# Patient Record
Sex: FEMALE | Race: WHITE | NOT HISPANIC OR LATINO | Employment: UNEMPLOYED | URBAN - METROPOLITAN AREA
[De-identification: names, ages, dates, MRNs, and addresses within clinical notes are randomized per-mention and may not be internally consistent; named-entity substitution may affect disease eponyms.]

---

## 2024-09-05 ENCOUNTER — TRANSCRIBE ORDERS (OUTPATIENT)
Dept: ADMINISTRATIVE | Facility: HOSPITAL | Age: 49
End: 2024-09-05

## 2024-09-05 ENCOUNTER — HOSPITAL ENCOUNTER (OUTPATIENT)
Dept: GENERAL RADIOLOGY | Facility: HOSPITAL | Age: 49
Discharge: HOME | End: 2024-09-05
Admitting: NURSE PRACTITIONER
Payer: MEDICAID

## 2024-09-05 DIAGNOSIS — R06.02 SHORTNESS OF BREATH: Primary | ICD-10-CM

## 2024-09-05 DIAGNOSIS — R06.02 SHORTNESS OF BREATH: ICD-10-CM

## 2024-09-05 DIAGNOSIS — R06.00 DYSPNEA, UNSPECIFIED TYPE: ICD-10-CM

## 2024-09-05 PROCEDURE — 71046 X-RAY EXAM CHEST 2 VIEWS: CPT

## 2024-10-10 ENCOUNTER — OFFICE VISIT (OUTPATIENT)
Dept: FAMILY MEDICINE CLINIC | Facility: CLINIC | Age: 49
End: 2024-10-10
Payer: MEDICAID

## 2024-10-10 VITALS
DIASTOLIC BLOOD PRESSURE: 81 MMHG | HEIGHT: 67 IN | SYSTOLIC BLOOD PRESSURE: 133 MMHG | HEART RATE: 86 BPM | WEIGHT: 138.5 LBS | OXYGEN SATURATION: 99 % | BODY MASS INDEX: 21.74 KG/M2

## 2024-10-10 DIAGNOSIS — R06.02 SHORTNESS OF BREATH: ICD-10-CM

## 2024-10-10 DIAGNOSIS — Z12.11 SCREEN FOR COLON CANCER: ICD-10-CM

## 2024-10-10 DIAGNOSIS — Z12.4 SCREENING FOR CERVICAL CANCER: ICD-10-CM

## 2024-10-10 DIAGNOSIS — Z23 NEED FOR TDAP VACCINATION: ICD-10-CM

## 2024-10-10 DIAGNOSIS — Z23 NEEDS FLU SHOT: ICD-10-CM

## 2024-10-10 DIAGNOSIS — R00.0 RACING HEART BEAT: ICD-10-CM

## 2024-10-10 DIAGNOSIS — Z12.2 SCREENING FOR LUNG CANCER: ICD-10-CM

## 2024-10-10 DIAGNOSIS — Z00.00 ANNUAL PHYSICAL EXAM: Primary | ICD-10-CM

## 2024-10-10 DIAGNOSIS — F17.200 SMOKER: ICD-10-CM

## 2024-10-10 DIAGNOSIS — Z76.89 ENCOUNTER TO ESTABLISH CARE: ICD-10-CM

## 2024-10-10 DIAGNOSIS — Z11.59 NEED FOR HEPATITIS C SCREENING TEST: ICD-10-CM

## 2024-10-10 DIAGNOSIS — Z12.31 ENCOUNTER FOR SCREENING MAMMOGRAM FOR MALIGNANT NEOPLASM OF BREAST: ICD-10-CM

## 2024-10-10 PROCEDURE — 99386 PREV VISIT NEW AGE 40-64: CPT

## 2024-10-10 PROCEDURE — 1160F RVW MEDS BY RX/DR IN RCRD: CPT

## 2024-10-10 PROCEDURE — 90715 TDAP VACCINE 7 YRS/> IM: CPT

## 2024-10-10 PROCEDURE — 2014F MENTAL STATUS ASSESS: CPT

## 2024-10-10 PROCEDURE — 90656 IIV3 VACC NO PRSV 0.5 ML IM: CPT

## 2024-10-10 PROCEDURE — 1159F MED LIST DOCD IN RCRD: CPT

## 2024-10-10 PROCEDURE — 90472 IMMUNIZATION ADMIN EACH ADD: CPT

## 2024-10-10 PROCEDURE — 90471 IMMUNIZATION ADMIN: CPT

## 2024-10-10 RX ORDER — IBUPROFEN 400 MG/1
1 TABLET, FILM COATED ORAL 3 TIMES DAILY
COMMUNITY
Start: 2024-09-11

## 2024-10-10 RX ORDER — LIDOCAINE 50 MG/G
1 PATCH TOPICAL EVERY 24 HOURS
COMMUNITY
Start: 2024-08-12

## 2024-10-10 RX ORDER — ALBUTEROL SULFATE 90 UG/1
2 AEROSOL, METERED RESPIRATORY (INHALATION) EVERY 4 HOURS PRN
COMMUNITY
Start: 2024-09-04

## 2024-10-10 RX ORDER — HYDROXYZINE PAMOATE 25 MG/1
25 CAPSULE ORAL 3 TIMES DAILY PRN
COMMUNITY
Start: 2024-10-07

## 2024-10-10 RX ORDER — QUETIAPINE FUMARATE 50 MG/1
50 TABLET, FILM COATED ORAL
COMMUNITY
Start: 2024-10-04

## 2024-10-10 RX ORDER — BUPRENORPHINE HYDROCHLORIDE AND NALOXONE HYDROCHLORIDE DIHYDRATE 8; 2 MG/1; MG/1
1 TABLET SUBLINGUAL DAILY
COMMUNITY
Start: 2024-10-04

## 2024-10-10 RX ORDER — PAROXETINE 20 MG/1
1 TABLET, FILM COATED ORAL DAILY
COMMUNITY
Start: 2024-10-07

## 2024-10-10 NOTE — PROGRESS NOTES
Chief Complaint  Establish Care and Annual Exam    SUBJECTIVE  Analisa Willett presents to [facility]    History of Present Illness  Patient is a 49-year-old female who presents today to establish care.  Previous PCP was [place], [place].  She is due annual physical exam, to be done in office today.     Patient is due lung cancer screening.  Will order low-dose CT for smoking history of 37.8 years.  Patient reports she has no shortness of breath.  Unsure if she has COPD.  Will order PFTs to assess.  Patient currently uses Ventolin as needed.    Patient reports that in 2016 she was told she had congestive heart failure.  Unable to find any records of this.  Patient does have shortness of breath and heart racing.  Will place referral to cardiology.  Will order BNP.    FLU- today    TDAP-  today     MAMMO-ordered today     PAP- obgyn referral to be placed, states she was told in the past she should get a hysterectomy, unsure of why.     COLON cancer screening- cologuard ordered today    SHINGLES-will have done next week as walk in appt    Patient is due labs. Orders placed at today's visit. Discussed with patient that these are fasting labs.           Past Medical History:   Diagnosis Date    Anxiety     Asthma     COPD (chronic obstructive pulmonary disease)     Shortness of breath     Substance abuse       Family History   Problem Relation Age of Onset    Heart disease Mother     Diabetes Mother     Heart disease Father     Diabetes Father     Diabetes Sister       Past Surgical History:   Procedure Laterality Date    CHOLECYSTECTOMY          Current Outpatient Medications:     buprenorphine-naloxone (SUBOXONE) 8-2 MG per SL tablet, Place 1 tablet under the tongue Daily., Disp: , Rfl:     hydrOXYzine pamoate (VISTARIL) 25 MG capsule, Take 1 capsule by mouth 3 (Three) Times a Day As Needed for Anxiety., Disp: , Rfl:     ibuprofen (ADVIL,MOTRIN) 400 MG  "tablet, Take 1 tablet by mouth 3 times a day., Disp: , Rfl:     lidocaine (LIDODERM) 5 %, Place 1 patch on the skin as directed by provider Daily., Disp: , Rfl:     One Daily Womens tablet tablet, Take 1 tablet by mouth Daily., Disp: , Rfl:     PARoxetine (PAXIL) 20 MG tablet, Take 1 tablet by mouth Daily., Disp: , Rfl:     QUEtiapine (SEROquel) 50 MG tablet, Take 1 tablet by mouth every night at bedtime., Disp: , Rfl:     Ventolin  (90 Base) MCG/ACT inhaler, Inhale 2 puffs Every 4 (Four) Hours As Needed for Shortness of Air., Disp: , Rfl:     OBJECTIVE  Vital Signs:   /81 (BP Location: Left arm, Patient Position: Sitting, Cuff Size: Adult)   Pulse 86   Ht 170.2 cm (67\")   Wt 62.8 kg (138 lb 8 oz)   SpO2 99%   BMI 21.69 kg/m²    Estimated body mass index is 21.69 kg/m² as calculated from the following:    Height as of this encounter: 170.2 cm (67\").    Weight as of this encounter: 62.8 kg (138 lb 8 oz).     Wt Readings from Last 3 Encounters:   10/10/24 62.8 kg (138 lb 8 oz)     BP Readings from Last 3 Encounters:   10/10/24 133/81       Physical Exam  Vitals reviewed.   Constitutional:       General: She is awake. She is not in acute distress.     Appearance: She is well-developed and well-groomed. She is not ill-appearing.   HENT:      Head: Normocephalic and atraumatic.      Right Ear: Tympanic membrane, ear canal and external ear normal.      Left Ear: Tympanic membrane, ear canal and external ear normal.      Nose: Nose normal.      Mouth/Throat:      Mouth: Mucous membranes are moist.      Dentition: Abnormal dentition.      Pharynx: Oropharynx is clear. No oropharyngeal exudate or posterior oropharyngeal erythema.   Eyes:      Extraocular Movements: Extraocular movements intact.      Conjunctiva/sclera: Conjunctivae normal.      Pupils: Pupils are equal, round, and reactive to light.   Neck:      Thyroid: No thyroid mass, thyromegaly or thyroid tenderness.   Cardiovascular:      Rate and " Rhythm: Normal rate and regular rhythm.      Heart sounds: Normal heart sounds.   Pulmonary:      Effort: Pulmonary effort is normal.      Breath sounds: Normal breath sounds.   Abdominal:      General: Abdomen is flat. Bowel sounds are normal. There is no distension.      Palpations: Abdomen is soft.      Tenderness: There is no abdominal tenderness.   Musculoskeletal:         General: Normal range of motion.      Cervical back: Normal range of motion and neck supple. No rigidity or tenderness.   Lymphadenopathy:      Cervical: No cervical adenopathy.   Skin:     General: Skin is warm and dry.   Neurological:      Mental Status: She is alert and oriented to person, place, and time.   Psychiatric:         Mood and Affect: Mood normal.         Behavior: Behavior normal. Behavior is cooperative.         Thought Content: Thought content normal.         Judgment: Judgment normal.          Result Review        XR Chest PA & Lateral    Result Date: 9/6/2024  Impression: No acute process Electronically Signed: Beltran Wood MD  9/6/2024 3:53 PM EDT  Workstation ID: [id number]        The above data has been reviewed by YOLA Carter 10/10/2024 14:19 EDT.          Patient Care Team:  Prerna Brunner APRN as PCP - General (Nurse Practitioner)    BMI is within normal parameters. No other follow-up for BMI required.      ASSESSMENT & PLAN    Diagnoses and all orders for this visit:    1. Annual physical exam (Primary)  Comments:  Preventative counseling  Healthy diet  Daily exercise  Get adequate sleep  Orders:  -     Comprehensive Metabolic Panel; Future  -     CBC & Differential; Future  -     Lipid Panel; Future  -     TSH+Free T4; Future    2. Encounter for screening mammogram for malignant neoplasm of breast  -     Mammo Screening Digital Tomosynthesis Bilateral With CAD; Future    3. Need for hepatitis C screening test  -     Hepatitis C antibody; Future    4. Encounter to establish care  Comments:  Medical history  medications reviewed with patient    5. Screening for cervical cancer  -     Ambulatory Referral to Obstetrics / Gynecology    6. Screen for colon cancer  -     Cologuard - Stool, Per Rectum; Future    7. Screening for lung cancer  -      CT Chest Low Dose Cancer Screening WO; Future    8. Smoker  Comments:  Smoking cessation counseling provided  Orders:  -      CT Chest Low Dose Cancer Screening WO; Future    9. Need for Tdap vaccination  -     Tdap Vaccine => 8yo IM (BOOSTRIX/ADACEL)    10. Needs flu shot  -     Fluzone >6mos ([id number])    11. Shortness of breath  Comments:  PFTs ordered, continue Ventolin, follow-up in 2 months  Orders:  -     Complete PFT - Pre & Post Bronchodilator; Future  -     proBNP; Future  -     Ambulatory Referral to Cardiology    12. Racing heart beat  Comments:  Referral to cardiology placed  Orders:  -     Ambulatory Referral to Cardiology         Tobacco Use: High Risk (10/10/2024)    Patient History     Smoking Tobacco Use: Every Day     Smokeless Tobacco Use: Never     Passive Exposure: Current       Follow Up     Return in about 2 months (around 12/10/2024) for Next scheduled follow up.      Patient was given instructions and counseling regarding her condition or for health maintenance advice. Please see specific information pulled into the AVS if appropriate.   I have reviewed information obtained and documented by others and I have confirmed the accuracy of this documented note.    YOLA Carter

## 2024-11-05 ENCOUNTER — TELEPHONE (OUTPATIENT)
Dept: FAMILY MEDICINE CLINIC | Facility: CLINIC | Age: 49
End: 2024-11-05
Payer: MEDICAID

## 2024-11-05 NOTE — TELEPHONE ENCOUNTER
"Relay     \"Please inform patient about results\"     Message from Prerna Brunner sent at 11/1/2024 11:53 AM EDT -----    Cologuard negative  "

## 2024-11-08 RX ORDER — ALBUTEROL SULFATE 90 UG/1
2 AEROSOL, METERED RESPIRATORY (INHALATION) EVERY 4 HOURS PRN
Qty: 8 G | Refills: 2 | Status: SHIPPED | OUTPATIENT
Start: 2024-11-08

## 2024-11-08 NOTE — TELEPHONE ENCOUNTER
Caller: Nancy Willettn    Relationship: Self    Best call back number: [phone]     Requested Prescriptions:   Requested Prescriptions     Pending Prescriptions Disp Refills    Ventolin  (90 Base) MCG/ACT inhaler       Sig: Inhale 2 puffs Every 4 (Four) Hours As Needed for Shortness of Air.        Pharmacy where request should be sent: [facility] - [place], [place] - [place] - [address] [facility] [address] - [phone] [facility] [phone] FX     Last office visit with prescribing clinician: 10/10/2024   Last telemedicine visit with prescribing clinician: Visit date not found   Next office visit with prescribing clinician: 12/20/2024       Does the patient have less than a 3 day supply:  [x] Yes  [] No    Would you like a call back once the refill request has been completed: [x] Yes [] No    If the office needs to give you a call back, can they leave a voicemail: [x] Yes [] No    Allan Pandya Rep   11/08/24 09:28 EST

## 2024-11-21 ENCOUNTER — OFFICE VISIT (OUTPATIENT)
Dept: CARDIOLOGY | Facility: CLINIC | Age: 49
End: 2024-11-21
Payer: MEDICAID

## 2024-11-21 VITALS
SYSTOLIC BLOOD PRESSURE: 140 MMHG | WEIGHT: 148.4 LBS | HEART RATE: 70 BPM | HEIGHT: 67 IN | BODY MASS INDEX: 23.29 KG/M2 | DIASTOLIC BLOOD PRESSURE: 83 MMHG

## 2024-11-21 DIAGNOSIS — I50.9 CHRONIC CONGESTIVE HEART FAILURE, UNSPECIFIED HEART FAILURE TYPE: Primary | ICD-10-CM

## 2024-11-21 RX ORDER — SPIRONOLACTONE 25 MG/1
25 TABLET ORAL DAILY
Qty: 90 TABLET | Refills: 3 | Status: SHIPPED | OUTPATIENT
Start: 2024-11-21

## 2024-11-21 RX ORDER — LOSARTAN POTASSIUM 25 MG/1
25 TABLET ORAL DAILY
Qty: 60 TABLET | Refills: 5 | Status: SHIPPED | OUTPATIENT
Start: 2024-11-21

## 2024-11-21 RX ORDER — FUROSEMIDE 40 MG/1
40 TABLET ORAL DAILY
Qty: 90 TABLET | Refills: 3 | Status: SHIPPED | OUTPATIENT
Start: 2024-11-21

## 2024-11-21 NOTE — PROGRESS NOTES
[facility]  Interventional Cardiology Patient Visit Note      Referring Provider:  Prerna Brunner, APRN  [address] [place] [address]    Reason for Referral:   Congestive Heart Failure    History of Presenting Illness:  History of Present Illness  The patient presents for evaluation of heart failure.    She reports experiencing a rapid heartbeat and shortness of breath during strenuous activities or even light walking. These symptoms have been present for approximately 3 months. She also mentions that she cannot carry heavy objects due to breathlessness. She has no history of heart-related procedures. In 2016, she was informed of early signs of congestive heart failure but did not follow up on it. She has experienced rapid heartbeats even at rest on a few occasions.    She does not have any leg swelling but admits to waking up at night due to shortness of breath, which requires her to sit at the edge of the bed to regain her breath. She has no history of high blood pressure but admits to occasional elevated readings. She does not monitor her blood pressure at home. She has no history of stents placed in her heart or any history of heart attacks. She was hospitalized in 2016 for similar symptoms, including swelling in her legs, hands, feet, and face.    She has not sought medical attention for several years and is currently in recovery at [facility]. 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 Her energy levels have been low for the past 3 months. She has gained weight, increasing from 116 pounds to 148 pounds since entering rehab on 2024. She is currently in a 6-month program at [facility] and is unsure of her discharge date.    FAMILY HISTORY  Her grandmother, aunt, and mother had heart disease. Her mother had 4 different open heart surgeries. Her grandmother on her mother's side had kidney cancer and lung cancer. Her daughter   of lung cancer. Her mother had congestive heart failure. Her father had heart problems. Her aunt  of lung cancer. Her grandfather on her mother's side  of throat cancer. Her great grandmother on her mother's side had leukemia, she  6 months before she was born.    Past Medical History  Past Medical History:   Diagnosis Date    Anxiety     Asthma     COPD (chronic obstructive pulmonary disease)     Shortness of breath     Substance abuse          Current Outpatient Medications:     buprenorphine-naloxone (SUBOXONE) 8-2 MG per SL tablet, Place 1 tablet under the tongue Daily., Disp: , Rfl:     hydrOXYzine pamoate (VISTARIL) 25 MG capsule, Take 1 capsule by mouth 3 (Three) Times a Day As Needed for Anxiety., Disp: , Rfl:     ibuprofen (ADVIL,MOTRIN) 400 MG tablet, Take 1 tablet by mouth 3 times a day., Disp: , Rfl:     lidocaine (LIDODERM) 5 %, Place 1 patch on the skin as directed by provider Daily., Disp: , Rfl:     One Daily Womens tablet tablet, Take 1 tablet by mouth Daily., Disp: , Rfl:     PARoxetine (PAXIL) 20 MG tablet, Take 1 tablet by mouth Daily., Disp: , Rfl:     QUEtiapine (SEROquel) 50 MG tablet, Take 1 tablet by mouth every night at bedtime., Disp: , Rfl:     Ventolin  (90 Base) MCG/ACT inhaler, Inhale 2 puffs Every 4 (Four) Hours As Needed for Shortness of Air., Disp: 8 g, Rfl: 2  Current outpatient and discharge medications have been reconciled for the patient.  Reviewed by: Neptali Maldonado MD     There are no discontinued medications.  No Known Allergies   Social History     Tobacco Use    Smoking status: Every Day     Current packs/day: 1.00     Average packs/day: 1 pack/day for 37.9 years (37.9 ttl pk-yrs)     Types: Cigarettes     Start date: 1987     Passive exposure: Current    Smokeless tobacco: Never   Vaping Use    Vaping status: Never Used   Substance Use Topics    Alcohol use: Not Currently    Drug use: Not Currently     Types: Methamphetamines, Heroin     Family  "History   Problem Relation Age of Onset    Heart disease Mother     Diabetes Mother     Heart disease Father     Diabetes Father     Diabetes Sister           Objective   /83 (BP Location: Left arm, Patient Position: Sitting, Cuff Size: Adult)   Pulse 70   Ht 170.2 cm (67\")   Wt 67.3 kg (148 lb 6.4 oz)   BMI 23.24 kg/m²     Wt Readings from Last 3 Encounters:   11/21/24 67.3 kg (148 lb 6.4 oz)   10/10/24 62.8 kg (138 lb 8 oz)     BP Readings from Last 3 Encounters:   11/21/24 140/83   10/10/24 133/81       Physical Exam  Constitutional:  Awake. Not in acute distress. Normal appearance.   Neck: No carotid bruit, hepatojugular reflux or JVD.   Cardiovascular:      Rate and Rhythm: Normal rate and regular rhythm.      Heart sounds: Normal heart sounds, S1 normal and S2 normal. No murmur heard.      No friction rub. No gallop. No S3 or S4 sounds.    Pulmonary: Pulmonary effort is normal. Normal breath sounds. No wheezing, rhonchi or rales.   Extremities: No Bilateral edema is noted.   Skin: Warm and dry. Non cyanotic, No petechiae or rash.   Neurological: Alert and oriented x 3  Psychiatric:  Behavior is cooperative.       Result Review :   The following data was reviewed by Neptali Maldonado MD on 11/21/2024   No results found for: \"PROBNP\"       No results found for: \"TSH\"   No results found for: \"FREET4\"   No results found for: \"DDIMERQUANT\"  No results found for: \"MG\"   No results found for: \"DIGOXIN\"   No results found for: \"TROPONINT\"   No results found for: \"POCTROP\"                 No results found for this or any previous visit.        The ASCVD Risk score (Cory DK, et al., 2019) failed to calculate for the following reasons:    Cannot find a previous HDL lab    Cannot find a previous total cholesterol lab        Assessment  Assessment & Plan  Congestive Heart failure.  The patient's symptoms and clinical presentation, including shortness of breath, waking up at night due to breathlessness, and a " history of early onset congestive heart failure, are indicative of heart failure.   An echocardiogram will be performed to assess cardiac function and determine if it is less than 40%.   She is advised to maintain a daily log of her blood pressure and weight.  Lasix will be initiated, with a regimen of one tablet in the morning and one in the evening for the first five days, followed by a single morning dose thereafter.   The target blood pressure is set at 130/80. She is encouraged to communicate regularly via Dragon Armyt.   If her condition does not improve within the next week, she is advised to seek immediate medical attention at the hospital.  Start losartan 25 g p.o. daily and spironolactone 25 mg p.o. daily.  Patient is due for lab work to be completed within a week.  Risk factor for heart failure include methamphetamine use.  Will schedule coronary CTA for evaluation of coronary artery disease as a workup      2.  Palpitations  The patient reports experiencing heart palpitations both with exertion and at rest.  I suspect that they are due to sinus tachycardia to compensate for the heart failure.  If these palpitations persist, a heart monitor will be considered during the next visit to understand the underlying cause. It is suspected that the palpitations may be due to fluid overload.    3. Medication Management.  The patient is advised to continue her current medications and to start Lasix as prescribed. She is also instructed to check her weight and blood pressure daily.    Review of the blood pressure log will indicate that if patient has hypertension history was not.    Plan                There are no diagnoses linked to this encounter.  Follow Up     No follow-ups on file.      Neptali Maldonado MD  Interventional Cardiology  11/21/2024  13:15 EST      Patient was given instructions and counseling regarding her condition or for health maintenance advice. Please see specific information pulled into the AVS if  appropriate.     Please note that portions of this document were completed using a voice recognition program.     Patient or patient representative verbalized consent for the use of Ambient Listening during the visit with  Neptali Maldonado MD for chart documentation. 11/21/2024  13:32 EST

## 2024-11-22 ENCOUNTER — HOSPITAL ENCOUNTER (OUTPATIENT)
Dept: CT IMAGING | Facility: HOSPITAL | Age: 49
Discharge: HOME | End: 2024-11-22
Payer: MEDICAID

## 2024-11-22 ENCOUNTER — TELEPHONE (OUTPATIENT)
Dept: FAMILY MEDICINE CLINIC | Facility: CLINIC | Age: 49
End: 2024-11-22
Payer: MEDICAID

## 2024-11-22 DIAGNOSIS — F17.200 SMOKER: ICD-10-CM

## 2024-11-22 DIAGNOSIS — I10 PRIMARY HYPERTENSION: Primary | ICD-10-CM

## 2024-11-22 DIAGNOSIS — Z12.2 SCREENING FOR LUNG CANCER: ICD-10-CM

## 2024-11-22 PROCEDURE — 71271 CT THORAX LUNG CANCER SCR C-: CPT

## 2024-11-22 NOTE — TELEPHONE ENCOUNTER
Patient wants to know if Prerna can order a blood pressure cuff for her. She is requesting a call back.

## 2024-11-25 NOTE — TELEPHONE ENCOUNTER
"Relay     \"  Placed DME order as pharmacy will not fill. Unsure if can do DME with insurance, if not can call insurance and request one.   \"    Attempted to leave VM - VM unable             "

## 2024-11-26 DIAGNOSIS — J44.9 CHRONIC OBSTRUCTIVE PULMONARY DISEASE, UNSPECIFIED COPD TYPE: ICD-10-CM

## 2024-11-26 DIAGNOSIS — R91.8 PULMONARY NODULES: Primary | ICD-10-CM

## 2024-12-02 NOTE — TELEPHONE ENCOUNTER
Name: Analisa Willett    Relationship: Self    Best Callback Number: [phone] CALL AFTER 3:30PM    HUB PROVIDED THE RELAY MESSAGE FROM THE OFFICE   PATIENT VOICED UNDERSTANDING AND HAS NO FURTHER QUESTIONS AT THIS TIME    ADDITIONAL INFORMATION: PATIENT WAS READ THE HUB RELAY MESSAGE VERBATIM. PATIENT HAS NO QUESTIONS AT THIS TIME.     PATIENT WOULD LIKE A CALL BACK TO DISCUSS TEST RESULTS. PATIENT WILL CALL HER INSURANCE AND FIND OUT IF THE DME WILL BE COVERED OR NOT.

## 2024-12-06 ENCOUNTER — LAB (OUTPATIENT)
Facility: HOSPITAL | Age: 49
End: 2024-12-06
Payer: MEDICAID

## 2024-12-06 DIAGNOSIS — I50.9 CHRONIC CONGESTIVE HEART FAILURE, UNSPECIFIED HEART FAILURE TYPE: ICD-10-CM

## 2024-12-06 DIAGNOSIS — Z00.00 ANNUAL PHYSICAL EXAM: ICD-10-CM

## 2024-12-06 DIAGNOSIS — R06.02 SHORTNESS OF BREATH: ICD-10-CM

## 2024-12-06 DIAGNOSIS — Z11.59 NEED FOR HEPATITIS C SCREENING TEST: ICD-10-CM

## 2024-12-06 LAB
ALBUMIN SERPL-MCNC: 3.5 G/DL (ref 3.5–5.2)
ALBUMIN/GLOB SERPL: 0.9 G/DL
ALP SERPL-CCNC: 94 U/L (ref 39–117)
ALT SERPL W P-5'-P-CCNC: 64 U/L (ref 1–33)
ANION GAP SERPL CALCULATED.3IONS-SCNC: 6.5 MMOL/L (ref 5–15)
AST SERPL-CCNC: 33 U/L (ref 1–32)
BASOPHILS # BLD AUTO: 0.05 10*3/MM3 (ref 0–0.2)
BASOPHILS NFR BLD AUTO: 1 % (ref 0–1.5)
BILIRUB SERPL-MCNC: 0.4 MG/DL (ref 0–1.2)
BUN SERPL-MCNC: 18 MG/DL (ref 6–20)
BUN/CREAT SERPL: 19.6 (ref 7–25)
CALCIUM SPEC-SCNC: 9.2 MG/DL (ref 8.6–10.5)
CHLORIDE SERPL-SCNC: 100 MMOL/L (ref 98–107)
CHOLEST SERPL-MCNC: 226 MG/DL (ref 0–200)
CO2 SERPL-SCNC: 31.5 MMOL/L (ref 22–29)
CREAT SERPL-MCNC: 0.92 MG/DL (ref 0.57–1)
DEPRECATED RDW RBC AUTO: 38.2 FL (ref 37–54)
EGFRCR SERPLBLD CKD-EPI 2021: 75.5 ML/MIN/1.73
EOSINOPHIL # BLD AUTO: 0.33 10*3/MM3 (ref 0–0.4)
EOSINOPHIL NFR BLD AUTO: 6.4 % (ref 0.3–6.2)
ERYTHROCYTE [DISTWIDTH] IN BLOOD BY AUTOMATED COUNT: 11.4 % (ref 12.3–15.4)
GLOBULIN UR ELPH-MCNC: 3.7 GM/DL
GLUCOSE SERPL-MCNC: 92 MG/DL (ref 65–99)
HBA1C MFR BLD: 5.4 % (ref 4.8–5.6)
HCT VFR BLD AUTO: 41.5 % (ref 34–46.6)
HCV AB SER QL: NORMAL
HDLC SERPL-MCNC: 57 MG/DL (ref 40–60)
HGB BLD-MCNC: 13.7 G/DL (ref 12–15.9)
IMM GRANULOCYTES # BLD AUTO: 0.01 10*3/MM3 (ref 0–0.05)
IMM GRANULOCYTES NFR BLD AUTO: 0.2 % (ref 0–0.5)
LDLC SERPL CALC-MCNC: 158 MG/DL (ref 0–100)
LDLC/HDLC SERPL: 2.75 {RATIO}
LYMPHOCYTES # BLD AUTO: 2.21 10*3/MM3 (ref 0.7–3.1)
LYMPHOCYTES NFR BLD AUTO: 43 % (ref 19.6–45.3)
MCH RBC QN AUTO: 30.6 PG (ref 26.6–33)
MCHC RBC AUTO-ENTMCNC: 33 G/DL (ref 31.5–35.7)
MCV RBC AUTO: 92.8 FL (ref 79–97)
MONOCYTES # BLD AUTO: 0.32 10*3/MM3 (ref 0.1–0.9)
MONOCYTES NFR BLD AUTO: 6.2 % (ref 5–12)
NEUTROPHILS NFR BLD AUTO: 2.22 10*3/MM3 (ref 1.7–7)
NEUTROPHILS NFR BLD AUTO: 43.2 % (ref 42.7–76)
NRBC BLD AUTO-RTO: 0 /100 WBC (ref 0–0.2)
NT-PROBNP SERPL-MCNC: <36 PG/ML (ref 0–900)
PLATELET # BLD AUTO: 191 10*3/MM3 (ref 140–450)
PMV BLD AUTO: 11 FL (ref 6–12)
POTASSIUM SERPL-SCNC: 4.1 MMOL/L (ref 3.5–5.2)
PROT SERPL-MCNC: 7.2 G/DL (ref 6–8.5)
RBC # BLD AUTO: 4.47 10*6/MM3 (ref 3.77–5.28)
SODIUM SERPL-SCNC: 138 MMOL/L (ref 136–145)
T4 FREE SERPL-MCNC: 1.03 NG/DL (ref 0.92–1.68)
TRIGL SERPL-MCNC: 62 MG/DL (ref 0–150)
TSH SERPL DL<=0.05 MIU/L-ACNC: 1.65 UIU/ML (ref 0.27–4.2)
VLDLC SERPL-MCNC: 11 MG/DL (ref 5–40)
WBC NRBC COR # BLD AUTO: 5.14 10*3/MM3 (ref 3.4–10.8)

## 2024-12-06 PROCEDURE — 83036 HEMOGLOBIN GLYCOSYLATED A1C: CPT

## 2024-12-06 PROCEDURE — 84443 ASSAY THYROID STIM HORMONE: CPT

## 2024-12-06 PROCEDURE — 83880 ASSAY OF NATRIURETIC PEPTIDE: CPT

## 2024-12-06 PROCEDURE — 36415 COLL VENOUS BLD VENIPUNCTURE: CPT

## 2024-12-06 PROCEDURE — 84439 ASSAY OF FREE THYROXINE: CPT

## 2024-12-06 PROCEDURE — 80053 COMPREHEN METABOLIC PANEL: CPT

## 2024-12-06 PROCEDURE — 85025 COMPLETE CBC W/AUTO DIFF WBC: CPT

## 2024-12-06 PROCEDURE — 86803 HEPATITIS C AB TEST: CPT

## 2024-12-06 PROCEDURE — 80061 LIPID PANEL: CPT

## 2024-12-10 RX ORDER — ROSUVASTATIN CALCIUM 5 MG/1
5 TABLET, COATED ORAL DAILY
Qty: 90 TABLET | Refills: 0 | Status: SHIPPED | OUTPATIENT
Start: 2024-12-10

## 2024-12-11 ENCOUNTER — TELEPHONE (OUTPATIENT)
Dept: FAMILY MEDICINE CLINIC | Facility: CLINIC | Age: 49
End: 2024-12-11
Payer: MEDICAID

## 2024-12-12 DIAGNOSIS — R91.8 PULMONARY NODULES: Primary | ICD-10-CM

## 2024-12-18 ENCOUNTER — OFFICE VISIT (OUTPATIENT)
Dept: PULMONOLOGY | Facility: CLINIC | Age: 49
End: 2024-12-18
Payer: MEDICAID

## 2024-12-18 VITALS
DIASTOLIC BLOOD PRESSURE: 67 MMHG | HEIGHT: 67 IN | BODY MASS INDEX: 23.86 KG/M2 | HEART RATE: 89 BPM | WEIGHT: 152 LBS | SYSTOLIC BLOOD PRESSURE: 130 MMHG | TEMPERATURE: 99 F | RESPIRATION RATE: 16 BRPM | OXYGEN SATURATION: 95 %

## 2024-12-18 DIAGNOSIS — Z72.0 TOBACCO ABUSE: ICD-10-CM

## 2024-12-18 DIAGNOSIS — R05.1 ACUTE COUGH: ICD-10-CM

## 2024-12-18 DIAGNOSIS — J43.2 CENTRILOBULAR EMPHYSEMA: ICD-10-CM

## 2024-12-18 DIAGNOSIS — R91.1 SOLITARY LUNG NODULE: ICD-10-CM

## 2024-12-18 DIAGNOSIS — R06.09 DOE (DYSPNEA ON EXERTION): Primary | ICD-10-CM

## 2024-12-18 DIAGNOSIS — R06.2 WHEEZE: ICD-10-CM

## 2024-12-18 RX ORDER — BUDESONIDE, GLYCOPYRROLATE, AND FORMOTEROL FUMARATE 160; 9; 4.8 UG/1; UG/1; UG/1
2 AEROSOL, METERED RESPIRATORY (INHALATION) 2 TIMES DAILY
Qty: 1 EACH | Refills: 11 | Status: SHIPPED | OUTPATIENT
Start: 2024-12-18

## 2024-12-18 RX ORDER — AZITHROMYCIN 250 MG/1
TABLET, FILM COATED ORAL
Qty: 6 TABLET | Refills: 0 | Status: SHIPPED | OUTPATIENT
Start: 2024-12-18

## 2024-12-18 RX ORDER — PREDNISONE 10 MG/1
TABLET ORAL
Qty: 31 TABLET | Refills: 0 | Status: SHIPPED | OUTPATIENT
Start: 2024-12-18

## 2024-12-18 NOTE — PROGRESS NOTES
Primary Care Provider  Prerna Brunner APRN   Referring Provider  YOLA Carter      Patient Complaint  Establish Care, Lung Nodule (7MM RUL), Cough (Productive, light yellow ), Shortness of Breath (Constant, worsens with ambulation ), and Chest Tightness      Subjective          Analisa Willett presents to [facility]      History of Presenting Illness  Analisa Willett is a 49 y.o. female here for evaluation for lung nodule.  She is an active cigarette smoker and smokes about a half to quarter pack of cigarettes a day.  Has had progressive worsening shortness of breath and cough with yellow sputum.  As part of this workup she was enrolled lung cancer screening which showed a 7 mm right upper lobe lung nodule, emphysematous changes and bronchial wall thickening.  She only takes albuterol as needed.  She takes it about 4-5 times a day with marked, albeit temporary, improvement in her symptoms.  Has never been told she has COPD emphysema.  Always has a cough reductive of thick cloudy secretions but has been having increasing respiratory symptoms over the last week.  Wheezing very easily.  Gets short of breath walking about 500 feet.  Dyspnea is moderate to severe in severity, worse with activity and relieved with rest.  Breathing has been so bad she has had to cut back on smoking.    Denies headaches, chest pain, weight loss or hemoptysis. Denies fevers, chills and night sweats. She is able to perform ADLs without difficulties and denies any swollen glands/lymph nodes in the head or neck.        Family History   Problem Relation Age of Onset    Heart disease Mother     Diabetes Mother     Heart disease Father     Diabetes Father     Diabetes Sister         Social History     Socioeconomic History    Marital status:    Tobacco Use    Smoking status: Every Day     Current packs/day: 0.25     Average packs/day: 1 pack/day for 38.0 years (37.9 ttl pk-yrs)      Types: Cigarettes     Start date: 1/1/1987     Passive exposure: Current    Smokeless tobacco: Never   Vaping Use    Vaping status: Never Used   Substance and Sexual Activity    Alcohol use: Not Currently    Drug use: Not Currently     Types: Methamphetamines, Heroin    Sexual activity: Defer        Past Medical History:   Diagnosis Date    Anxiety     Asthma     COPD (chronic obstructive pulmonary disease)     Shortness of breath     Substance abuse         Immunization History   Administered Date(s) Administered    Fluzone  >6mos 10/10/2024    Tdap 10/10/2024         No Known Allergies       Current Outpatient Medications:     buprenorphine-naloxone (SUBOXONE) 8-2 MG per SL tablet, Place 1 tablet under the tongue Daily., Disp: , Rfl:     furosemide (LASIX) 40 MG tablet, Take 1 tablet by mouth Daily., Disp: 90 tablet, Rfl: 3    hydrOXYzine pamoate (VISTARIL) 25 MG capsule, Take 1 capsule by mouth 3 (Three) Times a Day As Needed for Anxiety., Disp: , Rfl:     ibuprofen (ADVIL,MOTRIN) 400 MG tablet, Take 1 tablet by mouth 3 times a day., Disp: , Rfl:     lidocaine (LIDODERM) 5 %, Place 1 patch on the skin as directed by provider Daily., Disp: , Rfl:     losartan (COZAAR) 25 MG tablet, Take 1 tablet by mouth Daily., Disp: 60 tablet, Rfl: 5    One Daily Womens tablet tablet, Take 1 tablet by mouth Daily., Disp: , Rfl:     PARoxetine (PAXIL) 20 MG tablet, Take 1 tablet by mouth Daily., Disp: , Rfl:     QUEtiapine (SEROquel) 50 MG tablet, Take 1 tablet by mouth every night at bedtime., Disp: , Rfl:     rosuvastatin (Crestor) 5 MG tablet, Take 1 tablet by mouth Daily., Disp: 90 tablet, Rfl: 0    spironolactone (ALDACTONE) 25 MG tablet, Take 1 tablet by mouth Daily., Disp: 90 tablet, Rfl: 3    Ventolin  (90 Base) MCG/ACT inhaler, Inhale 2 puffs Every 4 (Four) Hours As Needed for Shortness of Air., Disp: 8 g, Rfl: 2    azithromycin (ZITHROMAX) 250 MG tablet, Take 2 by mouth today then 1 daily for 4 days, Disp: 6  "tablet, Rfl: 0    Budeson-Glycopyrrol-Formoterol (Breztri Aerosphere) 160-9-4.8 MCG/ACT aerosol inhaler, Inhale 2 puffs 2 (Two) Times a Day., Disp: 1 each, Rfl: 11    predniSONE (DELTASONE) 10 MG tablet, Take 4 tabs daily x 3 days, then take 3 tabs daily x 3 days, then take 2 tabs daily x 3 days, then take 1 tab daily x 3 days, Disp: 31 tablet, Rfl: 0         Objective     Vital Signs:   /67 (BP Location: Left arm, Patient Position: Sitting, Cuff Size: Adult)   Pulse 89   Temp 99 °F (37.2 °C) (Oral)   Resp 16   Ht 170.2 cm (67\")   Wt 68.9 kg (152 lb)   SpO2 95% Comment: Room air  BMI 23.81 kg/m²     Physical Exam  Vital Signs Reviewed   WDWN, Alert, NAD.    HEENT:  PERRL, EOMI.  OP with poor dentition, nares clear, no sinus tenderness  Neck:  Supple, no JVD, no thyromegaly  Lymph: no axillary, cervical, supraclavicular lymphadenopathy noted bilaterally  Chest:  good aeration, coarse wheezing bilaterally, tympanic to percussion bilaterally, no work of breathing noted  CV: RRR, no MGR, pulses 2+, equal.  Abd:  Soft, NT, ND, + BS, no HSM  EXT:  no clubbing, no cyanosis, no edema  Neuro:  A&Ox3, CN grossly intact, no focal deficits.  Skin: No rashes or lesions noted       Result Review :   I have personally reviewed the office notes from primary care.  Personally viewed chest CT from 2024 showing emphysematous changes, bronchial wall thickening, 7 mm right upper lobe lung nodule.  CBC personally reviewed showing 330 peripheral eosinophils.  CMP with no evidence of chronic hypercapnia.         Assessment and Plan        * No active hospital problems. *      Impression:  7 mm right upper lobe lung nodule.  Will need close follow-up via Fleischner criteria in this high risk patient  Acute exacerbation of emphysema.  Suspect she has COPD.  PFTs were ordered but pending.  Would benefit from controller inhaler therapy as well as management of her acute respiratory illness  Acute on chronic dyspnea acute on " chronic cough  Acute wheezing  Peripheral eosinophilia  Ongoing tobacco use of cigarettes.  Making efforts to cut back    Plan:  Regarding her lung nodule, will check noncontrast chest CT in 3 months via Fleischner criteria.  Will need to monitor for 24 months of stability.  If enlarging, will need robotic navigational bronchoscopy  Check full pulmonary function test and 6-minute walk test to assess for airflow obstruction, bronchodilator response and exertional hypoxemia  Check alpha-1 antitrypsin level and genotype  Start Breztri 2 puffs twice a day.  Continue albuterol as needed  Will give a Z-Christ and prednisone burst  Check CBC, CMP and IgE  Analisa Willett  reports that she has been smoking cigarettes. She started smoking about 37 years ago. She has a 37.9 pack-year smoking history. She has been exposed to tobacco smoke. She has never used smokeless tobacco. I have educated her on the risk of diseases from using tobacco products such as cancer, COPD, and heart disease. I advised her to quit and she is willing to quit. We have discussed the following method/s for tobacco cessation:  Cold Turkey and OTC Cessation Products.  Together we have set a quit date for 2 weeks from today.  She will follow up with me in 4 weeks or sooner to check on her progress. I spent 4 minutes counseling the patient.  Vaccination status: Up-to-date with flu vaccine.  Give Prevnar 20 today.  Medications personally reviewed.      Follow Up   Return in about 3 months (around 3/18/2025).  Patient was given instructions and counseling regarding her condition or for health maintenance advice. Please see specific information pulled into the AVS if appropriate.     Electronically signed by Landry Lopez MD, 12/18/24, 1:30 PM EST.

## 2024-12-20 ENCOUNTER — OFFICE VISIT (OUTPATIENT)
Dept: FAMILY MEDICINE CLINIC | Facility: CLINIC | Age: 49
End: 2024-12-20
Payer: MEDICAID

## 2024-12-20 VITALS
SYSTOLIC BLOOD PRESSURE: 120 MMHG | WEIGHT: 149.6 LBS | DIASTOLIC BLOOD PRESSURE: 75 MMHG | OXYGEN SATURATION: 94 % | BODY MASS INDEX: 23.48 KG/M2 | HEIGHT: 67 IN | HEART RATE: 79 BPM

## 2024-12-20 DIAGNOSIS — M54.6 CHRONIC MIDLINE THORACIC BACK PAIN: Primary | ICD-10-CM

## 2024-12-20 DIAGNOSIS — G89.29 CHRONIC MIDLINE THORACIC BACK PAIN: Primary | ICD-10-CM

## 2024-12-20 DIAGNOSIS — E78.2 MIXED HYPERLIPIDEMIA: ICD-10-CM

## 2024-12-20 DIAGNOSIS — J44.9 CHRONIC OBSTRUCTIVE PULMONARY DISEASE, UNSPECIFIED COPD TYPE: ICD-10-CM

## 2024-12-20 PROCEDURE — 1159F MED LIST DOCD IN RCRD: CPT

## 2024-12-20 PROCEDURE — 1160F RVW MEDS BY RX/DR IN RCRD: CPT

## 2024-12-20 PROCEDURE — 99214 OFFICE O/P EST MOD 30 MIN: CPT

## 2024-12-20 RX ORDER — LIDOCAINE 50 MG/G
1 PATCH TOPICAL EVERY 24 HOURS
Qty: 30 EACH | Refills: 2 | Status: SHIPPED | OUTPATIENT
Start: 2024-12-20

## 2024-12-20 NOTE — PROGRESS NOTES
Chief Complaint  COPD and Back Pain    PEEWEE Willett presents to [facility]    History of Present Illness  49-year-old female presents today for 2-month follow-up on shortness of breath.  Patient has been able to establish with pulmonology.  Patient had CT low-dose done.  CT showed 7 mm pulmonary nodule and emphysema.  Per pulmonology they are going to repeat a CT of the chest in 3 months.  Patient was seen by Dr. Lopez and started on Breztri.  She was also given a Z-Christ for acute lower respiratory infection.  Since starting the Breztri 2 days ago she has noticed improvement in her work of breathing.  She is scheduled for pulmonary function testing next month.  She was also started on Crestor 5 mg by Dr. Lopez for HLD.  Will repeat lipid panel to assess effectiveness in 3 months.  She has a follow-up with cardiology next month for congestive heart failure.    Patient is requesting refill on her lidocaine patches for chronic pain between her shoulder blades.    Past Medical History:   Diagnosis Date    Anxiety     Asthma     COPD (chronic obstructive pulmonary disease)     Shortness of breath     Substance abuse       Family History   Problem Relation Age of Onset    Heart disease Mother     Diabetes Mother     Heart disease Father     Diabetes Father     Diabetes Sister       Past Surgical History:   Procedure Laterality Date    CHOLECYSTECTOMY          Current Outpatient Medications:     azithromycin (ZITHROMAX) 250 MG tablet, Take 2 by mouth today then 1 daily for 4 days, Disp: 6 tablet, Rfl: 0    Budeson-Glycopyrrol-Formoterol (Breztri Aerosphere) 160-9-4.8 MCG/ACT aerosol inhaler, Inhale 2 puffs 2 (Two) Times a Day., Disp: 1 each, Rfl: 11    buprenorphine-naloxone (SUBOXONE) 8-2 MG per SL tablet, Place 1 tablet under the tongue Daily., Disp: , Rfl:     furosemide (LASIX) 40 MG tablet, Take 1 tablet by mouth Daily., Disp: 90 tablet, Rfl: 3    hydrOXYzine pamoate  "(VISTARIL) 25 MG capsule, Take 1 capsule by mouth 3 (Three) Times a Day As Needed for Anxiety., Disp: , Rfl:     ibuprofen (ADVIL,MOTRIN) 400 MG tablet, Take 1 tablet by mouth 3 times a day., Disp: , Rfl:     lidocaine (LIDODERM) 5 %, Place 1 patch on the skin as directed by provider Daily., Disp: 30 each, Rfl: 2    losartan (COZAAR) 25 MG tablet, Take 1 tablet by mouth Daily., Disp: 60 tablet, Rfl: 5    One Daily Womens tablet tablet, Take 1 tablet by mouth Daily., Disp: , Rfl:     PARoxetine (PAXIL) 20 MG tablet, Take 1 tablet by mouth Daily., Disp: , Rfl:     predniSONE (DELTASONE) 10 MG tablet, Take 4 tabs daily x 3 days, then take 3 tabs daily x 3 days, then take 2 tabs daily x 3 days, then take 1 tab daily x 3 days, Disp: 31 tablet, Rfl: 0    QUEtiapine (SEROquel) 50 MG tablet, Take 1 tablet by mouth every night at bedtime., Disp: , Rfl:     rosuvastatin (Crestor) 5 MG tablet, Take 1 tablet by mouth Daily., Disp: 90 tablet, Rfl: 0    spironolactone (ALDACTONE) 25 MG tablet, Take 1 tablet by mouth Daily., Disp: 90 tablet, Rfl: 3    Ventolin  (90 Base) MCG/ACT inhaler, Inhale 2 puffs Every 4 (Four) Hours As Needed for Shortness of Air., Disp: 8 g, Rfl: 2    OBJECTIVE  Vital Signs:   /75 (BP Location: Left arm, Patient Position: Sitting, Cuff Size: Adult)   Pulse 79   Ht 170.2 cm (67\")   Wt 67.9 kg (149 lb 9.6 oz)   SpO2 94%   BMI 23.43 kg/m²    Estimated body mass index is 23.43 kg/m² as calculated from the following:    Height as of this encounter: 170.2 cm (67\").    Weight as of this encounter: 67.9 kg (149 lb 9.6 oz).     Wt Readings from Last 3 Encounters:   12/20/24 67.9 kg (149 lb 9.6 oz)   12/18/24 68.9 kg (152 lb)   11/21/24 67.3 kg (148 lb 6.4 oz)     BP Readings from Last 3 Encounters:   12/20/24 120/75   12/18/24 130/67   11/21/24 140/83       Physical Exam  Vitals reviewed.   Constitutional:       General: She is not in acute distress.     Appearance: She is not ill-appearing. "   HENT:      Head: Normocephalic and atraumatic.   Eyes:      Conjunctiva/sclera: Conjunctivae normal.   Cardiovascular:      Rate and Rhythm: Normal rate and regular rhythm.      Heart sounds: Normal heart sounds.   Pulmonary:      Effort: Pulmonary effort is normal.      Breath sounds: Wheezing present.   Musculoskeletal:      Cervical back: Normal range of motion.      Thoracic back: Tenderness present.        Back:    Neurological:      Mental Status: She is alert and oriented to person, place, and time.   Psychiatric:         Mood and Affect: Mood normal.         Behavior: Behavior normal.         Thought Content: Thought content normal.         Judgment: Judgment normal.          Result Review    Common labs          12/6/2024    09:29   Common Labs   Glucose 92    BUN 18    Creatinine 0.92    Sodium 138    Potassium 4.1    Chloride 100    Calcium 9.2    Albumin 3.5    Total Bilirubin 0.4    Alkaline Phosphatase 94    AST (SGOT) 33    ALT (SGPT) 64    WBC 5.14    Hemoglobin 13.7    Hematocrit 41.5    Platelets 191    Total Cholesterol 226    Triglycerides 62    HDL Cholesterol 57    LDL Cholesterol  158    Hemoglobin A1C 5.40        CT Chest Low Dose Cancer Screening WO    Result Date: 11/25/2024  Impression: 1. Right upper lobe 7 mm pulmonary nodule. Recommend low-dose CT follow-up in 6 months. 2. Additional smaller pulmonary nodules measuring 4 mm. 3. Emphysema with bronchial wall thickening suggesting chronic bronchitis. 4. Coronary artery calcifications. 5. Variant arch anatomy with retroesophageal right subclavian artery. Recommendation: 6 month follow up with LDCT Lung Rads Assessment: Lung-RADS L3 - Probably benign, 1-2% chance of malignancy. Electronically Signed: Jonathan Fan MD  11/25/2024 4:05 PM EST  Workstation ID: [id number]    XR Chest PA & Lateral    Result Date: 9/6/2024  Impression: No acute process Electronically Signed: Beltran Wood MD  9/6/2024 3:53 PM EDT  Workstation ID: [id number]         The above data has been reviewed by YOLA Carter 12/20/2024 14:06 EST.          Patient Care Team:  Prerna Brunner APRN as PCP - General (Nurse Practitioner)    BMI is within normal parameters. No other follow-up for BMI required.       ASSESSMENT & PLAN    Diagnoses and all orders for this visit:    1. Chronic midline thoracic back pain (Primary)  Comments:  refilled lidocaine patches  Orders:  -     lidocaine (LIDODERM) 5 %; Place 1 patch on the skin as directed by provider Daily.  Dispense: 30 each; Refill: 2    2. Mixed hyperlipidemia  Comments:  repeat lipid panel in 3 months, continue crestor 5 mg  Orders:  -     Lipid panel; Future    3. Chronic obstructive pulmonary disease, unspecified COPD type  Comments:  improved SOA with breztri.         Tobacco Use: High Risk (12/20/2024)    Patient History     Smoking Tobacco Use: Every Day     Smokeless Tobacco Use: Never     Passive Exposure: Current       Follow Up     Return in about 4 months (around 4/20/2025) for Next scheduled follow up.      Patient was given instructions and counseling regarding her condition or for health maintenance advice. Please see specific information pulled into the AVS if appropriate.   I have reviewed information obtained and documented by others and I have confirmed the accuracy of this documented note.    YOLA Carter

## 2025-01-07 RX ORDER — BUDESONIDE, GLYCOPYRROLATE, AND FORMOTEROL FUMARATE 160; 9; 4.8 UG/1; UG/1; UG/1
2 AEROSOL, METERED RESPIRATORY (INHALATION) 2 TIMES DAILY
Qty: 2 EACH | Refills: 0 | COMMUNITY
Start: 2025-01-07 | End: 2025-01-08 | Stop reason: ALTCHOICE

## 2025-01-08 ENCOUNTER — TELEPHONE (OUTPATIENT)
Dept: PULMONOLOGY | Facility: CLINIC | Age: 50
End: 2025-01-08
Payer: MEDICAID

## 2025-01-08 DIAGNOSIS — R06.09 DOE (DYSPNEA ON EXERTION): Primary | ICD-10-CM

## 2025-01-08 DIAGNOSIS — R06.2 WHEEZE: ICD-10-CM

## 2025-01-08 DIAGNOSIS — J43.2 CENTRILOBULAR EMPHYSEMA: ICD-10-CM

## 2025-01-08 DIAGNOSIS — R91.1 SOLITARY LUNG NODULE: ICD-10-CM

## 2025-01-08 DIAGNOSIS — R05.1 ACUTE COUGH: ICD-10-CM

## 2025-01-08 RX ORDER — BUDESONIDE AND FORMOTEROL FUMARATE DIHYDRATE 160; 4.5 UG/1; UG/1
2 AEROSOL RESPIRATORY (INHALATION) 2 TIMES DAILY
Qty: 1 EACH | Refills: 11 | Status: SHIPPED | OUTPATIENT
Start: 2025-01-08

## 2025-01-08 RX ORDER — TIOTROPIUM BROMIDE INHALATION SPRAY 3.12 UG/1
2 SPRAY, METERED RESPIRATORY (INHALATION)
Qty: 1 EACH | Refills: 11 | Status: SHIPPED | OUTPATIENT
Start: 2025-01-08

## 2025-01-08 NOTE — TELEPHONE ENCOUNTER
Insurance denied breztri due to patient not having a diagnosis of COPD. Ok to try symbicort 160 and spiriva 2.5? Please advise, thank you.

## 2025-01-09 ENCOUNTER — HOSPITAL ENCOUNTER (OUTPATIENT)
Dept: RESPIRATORY THERAPY | Facility: HOSPITAL | Age: 50
Discharge: HOME | End: 2025-01-09
Payer: MEDICAID

## 2025-01-09 DIAGNOSIS — R06.02 SHORTNESS OF BREATH: ICD-10-CM

## 2025-01-09 PROCEDURE — 94726 PLETHYSMOGRAPHY LUNG VOLUMES: CPT

## 2025-01-09 PROCEDURE — 94729 DIFFUSING CAPACITY: CPT

## 2025-01-09 PROCEDURE — 94060 EVALUATION OF WHEEZING: CPT

## 2025-01-09 RX ORDER — ALBUTEROL SULFATE 0.83 MG/ML
2.5 SOLUTION RESPIRATORY (INHALATION) ONCE
Status: COMPLETED | OUTPATIENT
Start: 2025-01-09 | End: 2025-01-09

## 2025-01-09 RX ADMIN — ALBUTEROL SULFATE 2.5 MG: 2.5 SOLUTION RESPIRATORY (INHALATION) at 15:21

## 2025-01-23 ENCOUNTER — OFFICE VISIT (OUTPATIENT)
Dept: CARDIOLOGY | Facility: CLINIC | Age: 50
End: 2025-01-23
Payer: MEDICAID

## 2025-01-23 VITALS
HEART RATE: 86 BPM | BODY MASS INDEX: 25.18 KG/M2 | HEIGHT: 67 IN | WEIGHT: 160.4 LBS | SYSTOLIC BLOOD PRESSURE: 112 MMHG | DIASTOLIC BLOOD PRESSURE: 74 MMHG

## 2025-01-23 DIAGNOSIS — I50.9 CONGESTIVE HEART FAILURE, UNSPECIFIED HF CHRONICITY, UNSPECIFIED HEART FAILURE TYPE: Primary | ICD-10-CM

## 2025-01-23 RX ORDER — QUETIAPINE FUMARATE 100 MG/1
100 TABLET, FILM COATED ORAL
COMMUNITY
Start: 2025-01-13

## 2025-01-23 NOTE — PROGRESS NOTES
[facility]  Interventional Cardiology Patient Visit Note      Referring Provider:  No referring provider defined for this encounter.    Reason for Referral:   Congestive Heart Failure    History of Presenting Illness:  History of Present Illness  The patient Ms. Willett is a 49-year-old female who was referred to me for evaluation of congestive heart failure after she was noted to have shortness of breath.    In brief,She reports experiencing a rapid heartbeat and shortness of breath during strenuous activities or even light walking. These symptoms have been present for approximately 3 months. She also mentions that she cannot carry heavy objects due to breathlessness. She has no history of heart-related procedures. In 2016, she was informed of early signs of congestive heart failure but did not follow up on it. She has experienced rapid heartbeats even at rest on a few occasions.    In last visit she was initiated on guideline directed medical therapy including spironolactone and losartan.  She was advised to take Lasix on as-needed basis she was advised to take Lasix on daily basis.    Today,She does not report having any leg swelling orthopnea or PND.  However she continues to have exertional dyspnea with mild to moderate exertion.  She has not yet completed her echocardiogram.  She is getting a blood pressure measuring cuff and plans to maintain a blood pressure log.    She has a prior history of substance abuse and has now been sober. Her energy levels have been low for the past 3 months. She has gained weight, increasing from 116 pounds to 148 pounds since entering rehab on 2024.     FAMILY HISTORY  Her grandmother, aunt, and mother had heart disease. Her mother had 4 different open heart surgeries. Her grandmother on her mother's side had kidney cancer and lung cancer. Her daughter  of lung cancer. Her mother had congestive heart failure. Her father had heart  "problems. Her aunt  of lung cancer. Her grandfather on her mother's side  of throat cancer. Her great grandmother on her mother's side had leukemia, she  6 months before she was born.    Past Medical History  Past Medical History:   Diagnosis Date    Anxiety     Asthma     COPD (chronic obstructive pulmonary disease)     Shortness of breath     Substance abuse          Current outpatient and discharge medications have been reconciled for the patient.  Reviewed by: Neptali Maldonado MD     Medications Discontinued During This Encounter   Medication Reason    QUEtiapine (SEROquel) 50 MG tablet *Therapy completed     No Known Allergies   Social History     Tobacco Use    Smoking status: Every Day     Current packs/day: 0.25     Average packs/day: 1 pack/day for 38.1 years (38.0 ttl pk-yrs)     Types: Cigarettes     Start date: 1987     Passive exposure: Current    Smokeless tobacco: Never   Vaping Use    Vaping status: Never Used   Substance Use Topics    Alcohol use: Not Currently    Drug use: Not Currently     Types: Methamphetamines, Heroin     Family History   Problem Relation Age of Onset    Heart disease Mother     Diabetes Mother     Heart disease Father     Diabetes Father     Diabetes Sister           Objective   /74 (BP Location: Right arm, Patient Position: Sitting, Cuff Size: Large Adult)   Pulse 86   Ht 170.2 cm (67\")   Wt 72.8 kg (160 lb 6.4 oz)   BMI 25.12 kg/m²     Wt Readings from Last 3 Encounters:   25 72.8 kg (160 lb 6.4 oz)   24 67.9 kg (149 lb 9.6 oz)   24 68.9 kg (152 lb)     BP Readings from Last 3 Encounters:   25 112/74   24 120/75   24 130/67       Physical Exam  Constitutional:  Awake. Not in acute distress. Normal appearance.   Neck: No carotid bruit, hepatojugular reflux or JVD.   Cardiovascular:      Rate and Rhythm: Normal rate and regular rhythm.      Heart sounds: Normal heart sounds, S1 normal and S2 normal. No murmur heard.    " "  No friction rub. No gallop. No S3 or S4 sounds.    Pulmonary: Pulmonary effort is normal. Normal breath sounds. No wheezing, rhonchi or rales.   Extremities: No Bilateral edema is noted.   Skin: Warm and dry. Non cyanotic, No petechiae or rash.   Neurological: Alert and oriented x 3  Psychiatric:  Behavior is cooperative.       Result Review :   The following data was reviewed by Neptali Maldonado MD on 01/23/2025   proBNP   Date Value Ref Range Status   12/06/2024 <36.0 0.0 - 900.0 pg/mL Final     CMP          12/6/2024    09:29   CMP   Glucose 92    BUN 18    Creatinine 0.92    EGFR 75.5    Sodium 138    Potassium 4.1    Chloride 100    Calcium 9.2    Total Protein 7.2    Albumin 3.5    Globulin 3.7    Total Bilirubin 0.4    Alkaline Phosphatase 94    AST (SGOT) 33    ALT (SGPT) 64    Albumin/Globulin Ratio 0.9    BUN/Creatinine Ratio 19.6    Anion Gap 6.5         Lab Results   Component Value Date    TSH 1.650 12/06/2024      Lab Results   Component Value Date    FREET4 1.03 12/06/2024      No results found for: \"DDIMERQUANT\"  No results found for: \"MG\"   No results found for: \"DIGOXIN\"   No results found for: \"TROPONINT\"   No results found for: \"POCTROP\"       A1C Last 3 Results          12/6/2024    09:29   HGBA1C Last 3 Results   Hemoglobin A1C 5.40      Lipid Panel          12/6/2024    09:29   Lipid Panel   Total Cholesterol 226    Triglycerides 62    HDL Cholesterol 57    VLDL Cholesterol 11    LDL Cholesterol  158    LDL/HDL Ratio 2.75            No results found for this or any previous visit.        The 10-year ASCVD risk score (Cory JERONIMO, et al., 2019) is: 4.3%    Values used to calculate the score:      Age: 49 years      Sex: Female      Is Non- : No      Diabetic: No      Tobacco smoker: Yes      Systolic Blood Pressure: 112 mmHg      Is BP treated: Yes      HDL Cholesterol: 57 mg/dL      Total Cholesterol: 226 mg/dL        Assessment  Assessment & Plan  Congestive Heart " failure.  An echocardiogram will be performed to assess cardiac function and determine if it is less than 40%.   Thus far blood pressure has been optimally controlled on losartan and spironolactone.  She is advised to maintain a daily log of her blood pressure and weight.  Patient does not report admission to hospital from heart failure exacerbation since her prior visit.  Risk factor for heart failure include methamphetamine use.  Will schedule coronary CTA for evaluation of coronary artery disease as a workup    2.  Palpitations  The patient reports experiencing heart palpitations both with exertion and at rest.  I suspect that they are due to sinus tachycardia to compensate for the heart failure.  These episodes have resolved.      Review of the blood pressure log will indicate that if patient has hypertension history was not.                 There are no diagnoses linked to this encounter.  Follow Up     No follow-ups on file.      Neptali Maldonado MD  Interventional Cardiology  01/23/2025  14:38 EST      Patient was given instructions and counseling regarding her condition or for health maintenance advice. Please see specific information pulled into the AVS if appropriate.     Please note that portions of this document were completed using a voice recognition program.     Patient or patient representative verbalized consent for the use of Ambient Listening during the visit with  Neptali Maldonado MD for chart documentation. 1/23/2025  13:32 EST

## 2025-01-23 NOTE — PROGRESS NOTES
[facility]  Interventional Cardiology Patient Visit Note      Referring Provider:  No referring provider defined for this encounter.    Reason for Referral:     History of Presenting Illness:  Analisa Willett presents to clinic today with a chief complaint of         Past Medical History  Past Medical History:   Diagnosis Date    Anxiety     Asthma     COPD (chronic obstructive pulmonary disease)     Shortness of breath     Substance abuse          Current Outpatient Medications:     azithromycin (ZITHROMAX) 250 MG tablet, Take 2 by mouth today then 1 daily for 4 days, Disp: 6 tablet, Rfl: 0    budesonide-formoterol (SYMBICORT) 160-4.5 MCG/ACT inhaler, Inhale 2 puffs 2 (Two) Times a Day., Disp: 1 each, Rfl: 11    buprenorphine-naloxone (SUBOXONE) 8-2 MG per SL tablet, Place 1 tablet under the tongue Daily., Disp: , Rfl:     furosemide (LASIX) 40 MG tablet, Take 1 tablet by mouth Daily., Disp: 90 tablet, Rfl: 3    hydrOXYzine pamoate (VISTARIL) 25 MG capsule, Take 1 capsule by mouth 3 (Three) Times a Day As Needed for Anxiety., Disp: , Rfl:     ibuprofen (ADVIL,MOTRIN) 400 MG tablet, Take 1 tablet by mouth 3 times a day., Disp: , Rfl:     lidocaine (LIDODERM) 5 %, Place 1 patch on the skin as directed by provider Daily., Disp: 30 each, Rfl: 2    losartan (COZAAR) 25 MG tablet, Take 1 tablet by mouth Daily., Disp: 60 tablet, Rfl: 5    One Daily Womens tablet tablet, Take 1 tablet by mouth Daily., Disp: , Rfl:     PARoxetine (PAXIL) 20 MG tablet, Take 1 tablet by mouth Daily., Disp: , Rfl:     QUEtiapine (SEROquel) 100 MG tablet, Take 1 tablet by mouth every night at bedtime., Disp: , Rfl:     rosuvastatin (Crestor) 5 MG tablet, Take 1 tablet by mouth Daily., Disp: 90 tablet, Rfl: 0    spironolactone (ALDACTONE) 25 MG tablet, Take 1 tablet by mouth Daily., Disp: 90 tablet, Rfl: 3    tiotropium bromide monohydrate (Spiriva Respimat) 2.5 MCG/ACT aerosol solution inhaler, Inhale 2 puffs  "Daily., Disp: 1 each, Rfl: 11    Ventolin  (90 Base) MCG/ACT inhaler, Inhale 2 puffs Every 4 (Four) Hours As Needed for Shortness of Air., Disp: 8 g, Rfl: 2    predniSONE (DELTASONE) 10 MG tablet, Take 4 tabs daily x 3 days, then take 3 tabs daily x 3 days, then take 2 tabs daily x 3 days, then take 1 tab daily x 3 days (Patient not taking: Reported on 1/23/2025), Disp: 31 tablet, Rfl: 0  Current outpatient and discharge medications have been reconciled for the patient.  Reviewed by: Neptali Maldonado MD     Medications Discontinued During This Encounter   Medication Reason    QUEtiapine (SEROquel) 50 MG tablet *Therapy completed       No Known Allergies     Social History     Tobacco Use    Smoking status: Every Day     Current packs/day: 0.25     Average packs/day: 1 pack/day for 38.1 years (38.0 ttl pk-yrs)     Types: Cigarettes     Start date: 1/1/1987     Passive exposure: Current    Smokeless tobacco: Never   Vaping Use    Vaping status: Never Used   Substance Use Topics    Alcohol use: Not Currently    Drug use: Not Currently     Types: Methamphetamines, Heroin       Family History   Problem Relation Age of Onset    Heart disease Mother     Diabetes Mother     Heart disease Father     Diabetes Father     Diabetes Sister           Objective   /74 (BP Location: Right arm, Patient Position: Sitting, Cuff Size: Large Adult)   Pulse 86   Ht 170.2 cm (67\")   Wt 72.8 kg (160 lb 6.4 oz)   BMI 25.12 kg/m²     Wt Readings from Last 3 Encounters:   01/23/25 72.8 kg (160 lb 6.4 oz)   12/20/24 67.9 kg (149 lb 9.6 oz)   12/18/24 68.9 kg (152 lb)     BP Readings from Last 3 Encounters:   01/23/25 112/74   12/20/24 120/75   12/18/24 130/67       Physical Exam  Constitutional:  Awake. Not in acute distress. Normal appearance.   Neck: No carotid bruit, hepatojugular reflux or JVD.   Cardiovascular:      Rate and Rhythm: Normal rate and regular rhythm.      Chest Wall: PMI is not displaced.      Heart sounds: " "Normal heart sounds, S1 normal and S2 normal. No murmur heard.       No friction rub. No gallop. No S3 or S4 sounds.    Pulmonary: Pulmonary effort is normal. Normal breath sounds. No wheezing, rhonchi or rales.   Extremities: No Bilateral edema is noted.   Skin: Warm and dry. Non cyanotic, No petechiae or rash.   Neurological: Alert and oriented x 3  Psychiatric:  Behavior is cooperative.       Result Review :{Labs  Result Review  Imaging  Med Tab  Media :23}   The following data was reviewed by Neptali Maldonado MD on 01/23/2025   proBNP   Date Value Ref Range Status   12/06/2024 <36.0 0.0 - 900.0 pg/mL Final     CMP          12/6/2024    09:29   CMP   Glucose 92    BUN 18    Creatinine 0.92    EGFR 75.5    Sodium 138    Potassium 4.1    Chloride 100    Calcium 9.2    Total Protein 7.2    Albumin 3.5    Globulin 3.7    Total Bilirubin 0.4    Alkaline Phosphatase 94    AST (SGOT) 33    ALT (SGPT) 64    Albumin/Globulin Ratio 0.9    BUN/Creatinine Ratio 19.6    Anion Gap 6.5      CBC w/diff          12/6/2024    09:29   CBC w/Diff   WBC 5.14    RBC 4.47    Hemoglobin 13.7    Hematocrit 41.5    MCV 92.8    MCH 30.6    MCHC 33.0    RDW 11.4    Platelets 191    Neutrophil Rel % 43.2    Immature Granulocyte Rel % 0.2    Lymphocyte Rel % 43.0    Monocyte Rel % 6.2    Eosinophil Rel % 6.4    Basophil Rel % 1.0       Lab Results   Component Value Date    TSH 1.650 12/06/2024      Lab Results   Component Value Date    FREET4 1.03 12/06/2024      No results found for: \"DDIMERQUANT\"  No results found for: \"MG\"   No results found for: \"DIGOXIN\"   No results found for: \"TROPONINT\"   No results found for: \"POCTROP\"       A1C Last 3 Results          12/6/2024    09:29   HGBA1C Last 3 Results   Hemoglobin A1C 5.40      Lipid Panel          12/6/2024    09:29   Lipid Panel   Total Cholesterol 226    Triglycerides 62    HDL Cholesterol 57    VLDL Cholesterol 11    LDL Cholesterol  158    LDL/HDL Ratio 2.75            No results " found for this or any previous visit.          The 10-year ASCVD risk score (Cory JERONIMO, et al., 2019) is: 4.3%    Values used to calculate the score:      Age: 49 years      Sex: Female      Is Non- : No      Diabetic: No      Tobacco smoker: Yes      Systolic Blood Pressure: 112 mmHg      Is BP treated: Yes      HDL Cholesterol: 57 mg/dL      Total Cholesterol: 226 mg/dL        Assessment  No diagnosis found.    Plan    There are no diagnoses linked to this encounter.           Follow Up {Instructions Charge Capture  Follow-up Communications :23}    No follow-ups on file.      Neptali Maldonado MD  Interventional Cardiology  01/23/2025  14:37 EST      Patient was given instructions and counseling regarding her condition or for health maintenance advice. Please see specific information pulled into the AVS if appropriate.     Please note that portions of this document were completed using a voice recognition program.

## 2025-01-26 NOTE — PATIENT INSTRUCTIONS
Managing the Challenge of Quitting Smoking  Quitting smoking is a physical and mental challenge. You may have cravings, withdrawal symptoms, and temptation to smoke. Before quitting, work with your health care provider to make a plan that can help you manage quitting. Making a plan before you quit may keep you from smoking when you have the urge to smoke while trying to quit.  How to manage lifestyle changes  Managing stress  Stress can make you want to smoke, and wanting to smoke may cause stress. It is important to find ways to manage your stress. You could try some of the following:  Practice relaxation techniques.  Breathe slowly and deeply, in through your nose and out through your mouth.  Listen to music.  Soak in a bath or take a shower.  Imagine a peaceful place or vacation.  Get some support.  Talk with family or friends about your stress.  Join a support group.  Talk with a counselor or therapist.  Get some physical activity.  Go for a walk, run, or bike ride.  Play a favorite sport.  Practice yoga.    Medicines  Talk with your health care provider about medicines that might help you deal with cravings and make quitting easier for you.  Relationships  Social situations can be difficult when you are quitting smoking. To manage this, you can:  Avoid parties and other social situations where people might be smoking.  Avoid alcohol.  Leave right away if you have the urge to smoke.  Explain to your family and friends that you are quitting smoking. Ask for support and let them know you might be a bit grumpy.  Plan activities where smoking is not an option.  General instructions  Be aware that many people gain weight after they quit smoking. However, not everyone does. To keep from gaining weight, have a plan in place before you quit, and stick to the plan after you quit. Your plan should include:  Eating healthy snacks. When you have a craving, it may help to:  Eat popcorn, or try carrots, celery, or other cut  vegetables.  Chew sugar-free gum.  Changing how you eat.  Eat small portion sizes at meals.  Eat 4-6 small meals throughout the day instead of 1-2 large meals a day.  Be mindful when you eat. You should avoid watching television or doing other things that might distract you as you eat.  Exercising regularly.  Make time to exercise each day. If you do not have time for a long workout, do short bouts of exercise for 5-10 minutes several times a day.  Do some form of strengthening exercise, such as weight lifting.  Do some exercise that gets your heart beating and causes you to breathe deeply, such as walking fast, running, swimming, or biking. This is very important.  Drinking plenty of water or other low-calorie or no-calorie drinks. Drink enough fluid to keep your urine pale yellow.    How to recognize withdrawal symptoms  Your body and mind may experience discomfort as you try to get used to not having nicotine in your system. These effects are called withdrawal symptoms. They may include:  Feeling hungrier than normal.  Having trouble concentrating.  Feeling irritable or restless.  Having trouble sleeping.  Feeling depressed.  Craving a cigarette.  These symptoms may surprise you, but they are normal to have when quitting smoking.  To manage withdrawal symptoms:  Avoid places, people, and activities that trigger your cravings.  Remember why you want to quit.  Get plenty of sleep.  Avoid coffee and other drinks that contain caffeine. These may worsen some of your symptoms.  How to manage cravings  Come up with a plan for how to deal with your cravings. The plan should include the following:  A definition of the specific situation you want to deal with.  An activity or action you will take to replace smoking.  A clear idea for how this action will help.  The name of someone who could help you with this.  Cravings usually last for 5-10 minutes. Consider taking the following actions to help you with your plan to deal  with cravings:  Keep your mouth busy.  Chew sugar-free gum.  Suck on hard candies or a straw.  Brush your teeth.  Keep your hands and body busy.  Change to a different activity right away.  Squeeze or play with a ball.  Do an activity or a hobby, such as making bead jewelry, practicing needlepoint, or working with wood.  Mix up your normal routine.  Take a short exercise break. Go for a quick walk, or run up and down stairs.  Focus on doing something kind or helpful for someone else.  Call a friend or family member to talk during a craving.  Join a support group.  Contact a quitline.  Where to find support  To get help or find a support group:  Call the [employer]'s Smoking Quitline: 9-337-QUIT-NOW (622-1598)  Text QUIT to SmokefreeTXT: 132833  Where to find more information  Visit these websites to find more information on quitting smoking:  [place] Department of Health and Human Services: www.smokefree.gov  American Lung Association: www.freedomfromsmoking.org  Centers for Disease Control and Prevention (CDC): www.cdc.gov  American Heart Association: www.heart.org  Contact a health care provider if:  You want to change your plan for quitting.  The medicines you are taking are not helping.  Your eating feels out of control or you cannot sleep.  You feel depressed or become very anxious.  Summary  Quitting smoking is a physical and mental challenge. You will face cravings, withdrawal symptoms, and temptation to smoke again. Preparation can help you as you go through these challenges.  Try different techniques to manage stress, handle social situations, and prevent weight gain.  You can deal with cravings by keeping your mouth busy (such as by chewing gum), keeping your hands and body busy, calling family or friends, or contacting a quitline for people who want to quit smoking.  You can deal with withdrawal symptoms by avoiding places where people smoke, getting plenty of rest, and avoiding drinks that  contain caffeine.  This information is not intended to replace advice given to you by your health care provider. Make sure you discuss any questions you have with your health care provider.  Document Revised: 12/09/2022 Document Reviewed: 12/09/2022  [employer] Patient Education © 2024 [employer]Chronic Obstructive Pulmonary Disease Exacerbation    Chronic obstructive pulmonary disease (COPD) is a long-term (chronic) lung problem.  When you have COPD, it can feel harder to breathe in or out.  COPD exacerbation is a flare-up of symptoms when breathing gets worse and more treatment may be needed. Without treatment, flare-ups can be life-threatening. If they happen often, your lungs can become more damaged.  What are the causes?  Not taking your usual COPD medicines as told by your health care provider.  A cold or the flu, which can cause infection in your lungs.  Being exposed to things that make your breathing worse, such as:  Smoke.  Air pollution.  Fumes.  Dust.  Allergies.  Weather changes.  What are the signs or symptoms?  Symptoms do not get better or get worse even if you take your medicines as told by your provider. Symptoms may include:  More shortness of breath. You may only be able to speak one or two words at a time.  More coughing or mucus from your lungs.  More wheezing or chest tightness.  Being more tired and having less energy.  Confusion.  How is this diagnosed?  This condition is diagnosed based on:  Symptoms that get worse.  Your medical history.  A physical exam.  You may also have tests, including:  A chest X-ray.  Blood or mucus tests.  How is this treated?  You may be able to stay home or you may need to go to the hospital. Treatment may include:  Taking medicines. These may include:  Inhalers. These have medicines in them that you breathe in. These may be more of what you already take or they may be new.  Steroids. These reduce inflammation in the airways. These may be inhaled, taken by  mouth, or given in an IV.  Antibiotics. These treat infection.  Using oxygen.  Using a device to help you clear mucus.  Follow these instructions at home:  Medicines  Take your medicines only as told by your provider.  If you were given antibiotics or steroids, take them as told by your provider. Do not stop taking them even if you start to feel better.  Lifestyle  Several times a day, wash your hands with soap and water for at least 20 seconds.  If you cannot use soap and water, use hand .  This may help keep you from getting an infection.  Avoid being around crowds or people who are sick.  Do not smoke or use any products that contain nicotine or tobacco. If you need help quitting, ask your provider.  Return to your normal activities when your provider says that it's safe.  Use breathing methods to control your stress and catch your breath.  How is this prevented?  Follow your COPD action plan. The action plan tells you what to do if you're feeling good and what to do when you start feeling worse. Discuss the plan often with your provider.  Make sure you get all the shots, also called vaccines, that your provider recommends. Ask your provider about a flu shot and a pneumonia shot.  Use oxygen therapy if told by your provider. If you need home oxygen therapy, ask your provider how often to check your oxygen level with a device called an oximeter.  Keep all follow-up visits to review your COPD action plan. Your provider will want to check on your condition often to keep you healthy and out of the hospital.  Contact a health care provider if:  Your COPD symptoms get worse.  You have a fever or chills.  You have trouble doing daily activities.  You have trouble breathing even when you are resting.  Get help right away if:  You are short of breath and cannot:  Talk in full sentences.  Do normal activities.  You have chest pain.  You feel confused.  These symptoms may be an emergency. Call 911 right away.  Do  not wait to see if the symptoms will go away.  Do not drive yourself to the hospital.  This information is not intended to replace advice given to you by your health care provider. Make sure you discuss any questions you have with your health care provider.  Document Revised: 09/19/2024 Document Reviewed: 03/04/2024  [employer] Patient Education © 2024 [employer]

## 2025-01-26 NOTE — PROGRESS NOTES
Primary Care Provider  Prerna Brunner APRN     Referring Provider  No ref. provider found     Chief Complaint  Cough, Shortness of Breath, Wheezing, and Follow-up ([facility] urgent care. )    Subjective          History of Presenting Illness  Patient is a 49-year-old female, patient of Dr. Acosta who presents for management of a lung nodule who presents for a follow-up visit today.  Patient states that she was seen at urgent care this past Saturday on 1/25/2025 and was diagnosed with influenza A and was prescribed Tamiflu.  Patient states that she is still not feeling better.  Patient states that she is having shortness of breath, productive cough, and wheezing.  Patient states that she is taking Symbicort and Spiriva every day as prescribed and uses albuterol inhaler and DuoNeb nebulizer treatments as needed. Last office visit patient had alpha-1 antitrypsin level and genotype drawn.  Alpha-1 came back with a normal genotype of MM with a level of 171. Since last office visit patient had a pulmonary function test completed on 1/9/2025.  Report states very severe obstructive lung defect with significant bronchodilator response noted.  Air-trapping present.  Diffusion capacity mildly reduced. Patient denies fever, chills, night sweats, swollen glands in the head and neck, unintentional weight loss, hemoptysis, dysphagia, chest pain, palpitations, chest tightness, abdominal pain, nausea, vomiting, and diarrhea.  Patient denies any leg swelling, orthopnea, paroxysmal nocturnal dyspnea.  Patient is able to perform activities of daily living.        Review of Systems     Family History   Problem Relation Age of Onset    Heart disease Mother     Diabetes Mother     Heart disease Father     Diabetes Father     Diabetes Sister         Social History     Socioeconomic History    Marital status:    Tobacco Use    Smoking status: Every Day     Current packs/day: 1.00     Average packs/day: 1 pack/day for 38.1 years (38.1  ttl pk-yrs)     Types: Cigarettes     Start date: 1/1/1987     Passive exposure: Current    Smokeless tobacco: Never   Vaping Use    Vaping status: Never Used   Substance and Sexual Activity    Alcohol use: Not Currently    Drug use: Not Currently     Types: Methamphetamines, Heroin    Sexual activity: Defer        Past Medical History:   Diagnosis Date    Anxiety     Asthma     COPD (chronic obstructive pulmonary disease)     Shortness of breath     Substance abuse         Immunization History   Administered Date(s) Administered    Fluzone  >6mos 10/10/2024    Pneumococcal Conjugate 20-Valent (PCV20) 12/18/2024    Tdap 10/10/2024       No Known Allergies       Current Outpatient Medications:     budesonide-formoterol (SYMBICORT) 160-4.5 MCG/ACT inhaler, Inhale 2 puffs 2 (Two) Times a Day., Disp: 1 each, Rfl: 11    buprenorphine-naloxone (SUBOXONE) 8-2 MG per SL tablet, Place 1 tablet under the tongue Daily., Disp: , Rfl:     furosemide (LASIX) 40 MG tablet, Take 1 tablet by mouth Daily., Disp: 90 tablet, Rfl: 3    hydrOXYzine pamoate (VISTARIL) 25 MG capsule, Take 1 capsule by mouth 3 (Three) Times a Day As Needed for Anxiety., Disp: , Rfl:     ibuprofen (ADVIL,MOTRIN) 400 MG tablet, Take 1 tablet by mouth 3 times a day., Disp: , Rfl:     ipratropium-albuterol (DUO-NEB) 0.5-2.5 mg/3 ml nebulizer, Take 3 mL by nebulization Every 4 (Four) Hours As Needed for Wheezing., Disp: 360 mL, Rfl: 0    lidocaine (LIDODERM) 5 %, Place 1 patch on the skin as directed by provider Daily., Disp: 30 each, Rfl: 2    losartan (COZAAR) 25 MG tablet, Take 1 tablet by mouth Daily., Disp: 60 tablet, Rfl: 5    One Daily Womens tablet tablet, Take 1 tablet by mouth Daily., Disp: , Rfl:     oseltamivir (TAMIFLU) 6 MG/ML suspension, Take 12.5 mL by mouth 2 (Two) Times a Day for 5 days., Disp: 125 mL, Rfl: 0    PARoxetine (PAXIL) 20 MG tablet, Take 1 tablet by mouth Daily., Disp: , Rfl:     QUEtiapine (SEROquel) 100 MG tablet, Take 1 tablet by  "mouth every night at bedtime., Disp: , Rfl:     rosuvastatin (Crestor) 5 MG tablet, Take 1 tablet by mouth Daily., Disp: 90 tablet, Rfl: 0    spironolactone (ALDACTONE) 25 MG tablet, Take 1 tablet by mouth Daily., Disp: 90 tablet, Rfl: 3    tiotropium bromide monohydrate (Spiriva Respimat) 2.5 MCG/ACT aerosol solution inhaler, Inhale 2 puffs Daily., Disp: 1 each, Rfl: 11    Ventolin  (90 Base) MCG/ACT inhaler, Inhale 2 puffs Every 4 (Four) Hours As Needed for Shortness of Air., Disp: 18 g, Rfl: 5    doxycycline (VIBRAMYCIN) 100 MG capsule, Take 1 capsule by mouth 2 (Two) Times a Day for 7 days., Disp: 14 capsule, Rfl: 0    predniSONE (DELTASONE) 20 MG tablet, Take 2 tablets by mouth Daily., Disp: 14 tablet, Rfl: 0     Objective     Physical Exam  Vital Signs:   WDWN, Alert, NAD.    HEENT:  PERRL, EOMI.  OP, nares clear, no sinus tenderness  Neck:  Supple, no JVD, no thyromegaly.  Lymph: no axillary, cervical, supraclavicular lymphadenopathy noted bilaterally  Chest: Mildly decreased breath sounds throughout with some expiratory wheezes. Normal work of breathing noted.  Patient is able speak full sentences without difficulty.  CV: RRR, no MGR, pulses 2+, equal.  Abd:  Soft, NT, ND, + BS, no HSM  EXT:  no clubbing, no cyanosis, no edema, no joint tenderness  Neuro:  A&Ox3, CN grossly intact, no focal deficits.  Skin: No rashes or lesions noted.    /74 (BP Location: Right arm, Patient Position: Sitting, Cuff Size: Large Adult)   Pulse 79   Temp 98.3 °F (36.8 °C) (Oral)   Resp 20   Ht 170.2 cm (67.01\")   Wt 70 kg (154 lb 6.4 oz)   SpO2 91% Comment: room air  BMI 24.18 kg/m²         Result Review :   I have reviewed Dr. Lopez's last office visit note.  I also reviewed alpha 1 antitrypsin and genotype results. I also reviewed pulmonary function test report dated from 1/9/2025.  See scanned reports.    Procedures:              Assessment and Plan      Assessment:  1. 7 mm right upper lobe lung nodule. "  Will need close follow-up via Fleischner criteria in this high risk patient  2. 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 FEV1 of 35% predicted on PFTs completed on 1/9/2025. Alpha 1 antitrypsin with a normal genotype of MM with a level of 171.  3. Acute on chronic dyspnea acute on chronic cough  4. Acute wheezing  5. Peripheral eosinophilia.  6.  Influenza A: Patient is taking Tamiflu.  6. Tobacco abuse of cigarettes ongoing.  Patient is enrolled in lung cancer screening by her primary care provider.        Plan:  1.  For COPD exacerbation, will start patient on doxycycline 100 mg twice daily x 7 days and a prednisone burst.  Expectations and course of treatment discussed with the patient. How to take medications and possible side effects of medications discussed with the patient. Patient verbalized understanding and compliance.  2.  Order a chest x-ray, CBC, CMP, IgE level, and a respiratory culture for further evaluation.  3.  Patient to continue Tamiflu as prescribed by urgent care.  4.  Continue Symbicort and Spiriva as prescribed and rinse mouth out after each use.  5.  Continue albuterol inhaler and DuoNeb nebulizer treatments as needed.  6.  Will refer patient to pulmonary rehab.  Message has been sent to our nurse navigator to start the referral process.  7.  Vaccination status:  patient reports they are up-to-date with flu and pneumonia vaccines.  Patient declines COVID-19 vaccination.  Discussed with patient the benefits of vaccination including decreased risk of severe illness, hospitalization and death related to flu, pneumonia, and COVID-19.  Patient verbalized understanding.  Patient is advised to continue to follow CDC recommendations such as social distancing, wearing a mask, and washing hands for least 20 seconds.  8.  Smoking status: patient is a current cigarette smoker.  I counseled the patient on smoking cessation.  I counseled the patient on the risks of continued  smoking including the risk of lung cancer, head and neck cancer, renal cancer, heart disease, stroke, and early death.  Patient refuses nicotine replacement therapy or pharmacotherapy at this time.  Patient is advised to decrease the number of cigarettes they are smoking up until the point to where they can quit.  9.  Patient to call the office, 911, or go to the ER with new or worsening symptoms.  10.  Note given to patient in the office today to have off school until follow-up appointment.  11.  Follow-up in 1 week, sooner if needed.              Follow Up   Return in about 1 week (around 2/4/2025) for give note to have off school until follow up appointment.  Patient was given instructions and counseling regarding her condition or for health maintenance advice. Please see specific information pulled into the AVS if appropriate.

## 2025-01-28 ENCOUNTER — TELEPHONE (OUTPATIENT)
Dept: PULMONOLOGY | Facility: CLINIC | Age: 50
End: 2025-01-28

## 2025-01-28 ENCOUNTER — LAB (OUTPATIENT)
Facility: HOSPITAL | Age: 50
End: 2025-01-28
Payer: MEDICAID

## 2025-01-28 ENCOUNTER — HOSPITAL ENCOUNTER (OUTPATIENT)
Facility: HOSPITAL | Age: 50
Discharge: HOME | End: 2025-01-28
Payer: MEDICAID

## 2025-01-28 ENCOUNTER — OFFICE VISIT (OUTPATIENT)
Dept: PULMONOLOGY | Facility: CLINIC | Age: 50
End: 2025-01-28
Payer: MEDICAID

## 2025-01-28 VITALS
OXYGEN SATURATION: 91 % | SYSTOLIC BLOOD PRESSURE: 112 MMHG | WEIGHT: 154.4 LBS | DIASTOLIC BLOOD PRESSURE: 74 MMHG | HEIGHT: 67 IN | TEMPERATURE: 98.3 F | RESPIRATION RATE: 20 BRPM | BODY MASS INDEX: 24.23 KG/M2 | HEART RATE: 79 BPM

## 2025-01-28 DIAGNOSIS — J43.2 CENTRILOBULAR EMPHYSEMA: ICD-10-CM

## 2025-01-28 DIAGNOSIS — J44.89 ASTHMA-COPD OVERLAP SYNDROME: Primary | ICD-10-CM

## 2025-01-28 DIAGNOSIS — R05.3 CHRONIC COUGH: ICD-10-CM

## 2025-01-28 DIAGNOSIS — Z72.0 TOBACCO ABUSE: ICD-10-CM

## 2025-01-28 DIAGNOSIS — D72.19 PERIPHERAL EOSINOPHILIA: ICD-10-CM

## 2025-01-28 DIAGNOSIS — R91.1 LUNG NODULE: ICD-10-CM

## 2025-01-28 DIAGNOSIS — R06.00 DYSPNEA, UNSPECIFIED TYPE: ICD-10-CM

## 2025-01-28 DIAGNOSIS — J44.89 ASTHMA-COPD OVERLAP SYNDROME: ICD-10-CM

## 2025-01-28 DIAGNOSIS — R06.2 WHEEZING: ICD-10-CM

## 2025-01-28 DIAGNOSIS — R05.1 ACUTE COUGH: ICD-10-CM

## 2025-01-28 DIAGNOSIS — J10.1 INFLUENZA A: ICD-10-CM

## 2025-01-28 DIAGNOSIS — R91.1 SOLITARY LUNG NODULE: ICD-10-CM

## 2025-01-28 DIAGNOSIS — R06.09 DOE (DYSPNEA ON EXERTION): ICD-10-CM

## 2025-01-28 DIAGNOSIS — R06.2 WHEEZE: ICD-10-CM

## 2025-01-28 LAB
ALBUMIN SERPL-MCNC: 3.8 G/DL (ref 3.5–5.2)
ALBUMIN/GLOB SERPL: 1 G/DL
ALP SERPL-CCNC: 168 U/L (ref 39–117)
ALPHA1 GLOB MFR UR ELPH: 191 MG/DL (ref 90–200)
ALT SERPL W P-5'-P-CCNC: 107 U/L (ref 1–33)
ANION GAP SERPL CALCULATED.3IONS-SCNC: 8.3 MMOL/L (ref 5–15)
ANISOCYTOSIS BLD QL: ABNORMAL
AST SERPL-CCNC: 124 U/L (ref 1–32)
BASOPHILS # BLD MANUAL: 0 10*3/MM3 (ref 0–0.2)
BASOPHILS NFR BLD MANUAL: 0 % (ref 0–1.5)
BILIRUB SERPL-MCNC: 0.5 MG/DL (ref 0–1.2)
BUN SERPL-MCNC: 8 MG/DL (ref 6–20)
BUN/CREAT SERPL: 10.5 (ref 7–25)
CALCIUM SPEC-SCNC: 8.9 MG/DL (ref 8.6–10.5)
CHLORIDE SERPL-SCNC: 99 MMOL/L (ref 98–107)
CO2 SERPL-SCNC: 29.7 MMOL/L (ref 22–29)
CREAT SERPL-MCNC: 0.76 MG/DL (ref 0.57–1)
DEPRECATED RDW RBC AUTO: 43 FL (ref 37–54)
EGFRCR SERPLBLD CKD-EPI 2021: 95 ML/MIN/1.73
EOSINOPHIL # BLD MANUAL: 0 10*3/MM3 (ref 0–0.4)
EOSINOPHIL NFR BLD MANUAL: 0 % (ref 0.3–6.2)
ERYTHROCYTE [DISTWIDTH] IN BLOOD BY AUTOMATED COUNT: 12.6 % (ref 12.3–15.4)
GLOBULIN UR ELPH-MCNC: 3.7 GM/DL
GLUCOSE SERPL-MCNC: 91 MG/DL (ref 65–99)
HCT VFR BLD AUTO: 41.6 % (ref 34–46.6)
HGB BLD-MCNC: 13.7 G/DL (ref 12–15.9)
LYMPHOCYTES # BLD MANUAL: 2.59 10*3/MM3 (ref 0.7–3.1)
LYMPHOCYTES NFR BLD MANUAL: 5.2 % (ref 5–12)
MCH RBC QN AUTO: 30.9 PG (ref 26.6–33)
MCHC RBC AUTO-ENTMCNC: 32.9 G/DL (ref 31.5–35.7)
MCV RBC AUTO: 93.7 FL (ref 79–97)
MONOCYTES # BLD: 0.47 10*3/MM3 (ref 0.1–0.9)
NEUTROPHILS # BLD AUTO: 5.92 10*3/MM3 (ref 1.7–7)
NEUTROPHILS NFR BLD MANUAL: 66 % (ref 42.7–76)
NRBC BLD AUTO-RTO: 0 /100 WBC (ref 0–0.2)
PLAT MORPH BLD: NORMAL
PLATELET # BLD AUTO: 265 10*3/MM3 (ref 140–450)
PMV BLD AUTO: 11.2 FL (ref 6–12)
POIKILOCYTOSIS BLD QL SMEAR: ABNORMAL
POTASSIUM SERPL-SCNC: 3.6 MMOL/L (ref 3.5–5.2)
PROT SERPL-MCNC: 7.5 G/DL (ref 6–8.5)
RBC # BLD AUTO: 4.44 10*6/MM3 (ref 3.77–5.28)
SODIUM SERPL-SCNC: 137 MMOL/L (ref 136–145)
VARIANT LYMPHS NFR BLD MANUAL: 28.9 % (ref 19.6–45.3)
WBC MORPH BLD: NORMAL
WBC NRBC COR # BLD AUTO: 8.97 10*3/MM3 (ref 3.4–10.8)

## 2025-01-28 PROCEDURE — 82103 ALPHA-1-ANTITRYPSIN TOTAL: CPT

## 2025-01-28 PROCEDURE — 36415 COLL VENOUS BLD VENIPUNCTURE: CPT

## 2025-01-28 PROCEDURE — 85007 BL SMEAR W/DIFF WBC COUNT: CPT

## 2025-01-28 PROCEDURE — 80053 COMPREHEN METABOLIC PANEL: CPT

## 2025-01-28 PROCEDURE — 71046 X-RAY EXAM CHEST 2 VIEWS: CPT

## 2025-01-28 PROCEDURE — 1160F RVW MEDS BY RX/DR IN RCRD: CPT | Performed by: NURSE PRACTITIONER

## 2025-01-28 PROCEDURE — 85025 COMPLETE CBC W/AUTO DIFF WBC: CPT

## 2025-01-28 PROCEDURE — 82785 ASSAY OF IGE: CPT

## 2025-01-28 PROCEDURE — 87070 CULTURE OTHR SPECIMN AEROBIC: CPT

## 2025-01-28 PROCEDURE — 1159F MED LIST DOCD IN RCRD: CPT | Performed by: NURSE PRACTITIONER

## 2025-01-28 PROCEDURE — 99214 OFFICE O/P EST MOD 30 MIN: CPT | Performed by: NURSE PRACTITIONER

## 2025-01-28 PROCEDURE — 87205 SMEAR GRAM STAIN: CPT

## 2025-01-28 RX ORDER — PREDNISONE 20 MG/1
40 TABLET ORAL DAILY
Qty: 14 TABLET | Refills: 0 | Status: SHIPPED | OUTPATIENT
Start: 2025-01-28

## 2025-01-28 RX ORDER — ALBUTEROL SULFATE 90 UG/1
2 AEROSOL, METERED RESPIRATORY (INHALATION) EVERY 4 HOURS PRN
Qty: 18 G | Refills: 5 | Status: SHIPPED | OUTPATIENT
Start: 2025-01-28

## 2025-01-28 RX ORDER — DOXYCYCLINE 100 MG/1
100 CAPSULE ORAL 2 TIMES DAILY
Qty: 14 CAPSULE | Refills: 0 | Status: SHIPPED | OUTPATIENT
Start: 2025-01-28 | End: 2025-02-04

## 2025-01-30 DIAGNOSIS — R79.89 ELEVATED LFTS: Primary | ICD-10-CM

## 2025-01-30 LAB
BACTERIA SPEC RESP CULT: NORMAL
GRAM STN SPEC: NORMAL

## 2025-02-01 LAB — IGE SERPL-ACNC: 198 IU/ML (ref 6–495)

## 2025-02-14 ENCOUNTER — HOSPITAL ENCOUNTER (OUTPATIENT)
Facility: HOSPITAL | Age: 50
Discharge: HOME | End: 2025-02-14
Payer: MEDICAID

## 2025-02-14 DIAGNOSIS — I50.9 CHRONIC CONGESTIVE HEART FAILURE, UNSPECIFIED HEART FAILURE TYPE: ICD-10-CM

## 2025-02-14 PROCEDURE — 93306 TTE W/DOPPLER COMPLETE: CPT

## 2025-02-16 LAB
AV MEAN PRESS GRAD SYS DOP V1V2: 6.1 MMHG
AV VMAX SYS DOP: 184 CM/SEC
BH CV ECHO MEAS - AO MAX PG: 13.5 MMHG
BH CV ECHO MEAS - AO V2 VTI: 31.9 CM
BH CV ECHO MEAS - EDV(MOD-SP2): 88.3 ML
BH CV ECHO MEAS - EDV(MOD-SP4): 69.4 ML
BH CV ECHO MEAS - EF(MOD-SP2): 67.6 %
BH CV ECHO MEAS - EF(MOD-SP4): 67 %
BH CV ECHO MEAS - ESV(MOD-SP2): 28.6 ML
BH CV ECHO MEAS - ESV(MOD-SP4): 22.9 ML
BH CV ECHO MEAS - IVS/LVPW: 1.43 CM
BH CV ECHO MEAS - IVSD: 1 CM
BH CV ECHO MEAS - LA DIMENSION: 3.1 CM
BH CV ECHO MEAS - LAT PEAK E' VEL: 14.7 CM/SEC
BH CV ECHO MEAS - LV DIASTOLIC VOL/BSA (35-75): 37.8 CM2
BH CV ECHO MEAS - LV MAX PG: 5.8 MMHG
BH CV ECHO MEAS - LV MEAN PG: 3 MMHG
BH CV ECHO MEAS - LV SYSTOLIC VOL/BSA (12-30): 12.5 CM2
BH CV ECHO MEAS - LV V1 MAX: 120 CM/SEC
BH CV ECHO MEAS - LV V1 VTI: 26 CM
BH CV ECHO MEAS - LVIDD: 3.5 CM
BH CV ECHO MEAS - LVIDS: 1.7 CM
BH CV ECHO MEAS - LVOT DIAM: 2 CM
BH CV ECHO MEAS - LVPWD: 0.7 CM
BH CV ECHO MEAS - MED PEAK E' VEL: 15.7 CM/SEC
BH CV ECHO MEAS - MV A MAX VEL: 72.1 CM/SEC
BH CV ECHO MEAS - MV E MAX VEL: 101.6 CM/SEC
BH CV ECHO MEAS - MV E/A: 1.41
BH CV ECHO MEAS - SV(MOD-SP2): 59.7 ML
BH CV ECHO MEAS - SV(MOD-SP4): 46.5 ML
BH CV ECHO MEAS - SVI(MOD-SP2): 32.5 ML/M2
BH CV ECHO MEAS - SVI(MOD-SP4): 25.3 ML/M2
BH CV ECHO MEAS - TAPSE (>1.6): 2.27 CM
BH CV ECHO MEAS - TR MAX PG: 28.3 MMHG
BH CV ECHO MEAS - TR MAX VEL: 265.4 CM/SEC
BH CV ECHO MEASUREMENTS AVERAGE E/E' RATIO: 6.68
BH CV XLRA - RV BASE: 3.5 CM
BH CV XLRA - TDI S': 13.1 CM/SEC
LEFT ATRIUM VOLUME INDEX: 19.4 ML/M2
STJ: 2.8 CM

## 2025-02-20 ENCOUNTER — TELEPHONE (OUTPATIENT)
Dept: OBSTETRICS AND GYNECOLOGY | Facility: CLINIC | Age: 50
End: 2025-02-20

## 2025-02-20 NOTE — TELEPHONE ENCOUNTER
Caller: Analisa Willett    Relationship:  Self    Best call back number: [phone]      PATIENT CALLED REQUESTING TO CANCEL SAME DAY APPT.    Did the patient call AFTER the start time of their scheduled appointment?  []YES  [x]NO    Was the patient's appointment rescheduled? [x]YES  []NO

## 2025-03-09 NOTE — PROGRESS NOTES
Primary Care Provider  Prerna Brunner APRN     Referring Provider  No ref. provider found     Chief Complaint  Cough, Shortness of Breath, Wheezing, COPD, and Asthma    Subjective          History of Presenting Illness  Patient is a 49-year-old female, patient of Dr. Acosta who presents for management of a lung nodule who presents for a follow-up visit today.  Last office visit patient reported that she was diagnosed with influenza A in urgent care and was prescribed Tamiflu.  Patient reported last office visit that she was still not feeling better and was still having shortness of breath, productive cough, wheezing.  Patient was treated with doxycycline and a prednisone burst for COPD exacerbation. A chest x-ray, CBC, CMP, IgE level, and a respiratory culture were ordered for further evaluation. Patient had chest xray and labs completed on 1/28/2025. Chest xray report states no acute infiltrate is appreciated. No cardiac enlargement. Respiratory culture came back showing normal respiratory kimo. WBC came back normal. LFTs came back elevated and patient was referred to a gastroenterologist. IgE level came back at 198.Alpha-1 antitrypsin was drawn last office visit which came back with a normal genotype of MM with level of 171. Patient had a low-dose chest CT scan completed on 3/17/2025 that was ordered by her primary care provider.  Results are still pending.  Patient states that she is feeling better since last office visit and that her breathing is back to baseline. Patient states that she is taking Symbicort every day as prescribed and uses albuterol inhaler and DuoNeb nebulizer treatments as needed.  Patient states that she did not receive Spiriva inhaler that was sent to the pharmacy last office visit.  Patient was also referred to pulmonary rehab last office visit on 1/20/2025, however patient states that she still has not received appointment for pulmonary rehab yet.  Patient states that she is still  smoking and recently received a prescription for Chantix that she can start taking when she is ready to quit.  Patient is not ready to set a quit date at this time. Patient denies fever, chills, night sweats, swollen glands in the head and neck, unintentional weight loss, hemoptysis, purulent sputum production, dysphagia, chest pain, palpitations, chest tightness, abdominal pain, nausea, vomiting, and diarrhea.  Patient also denies any myalgias, changes in sense of taste and/or smell, sore throat, any other coronavirus or flu-like symptoms.  Patient denies any leg swelling, orthopnea, paroxysmal nocturnal dyspnea.  Patient is able to perform activities of daily living.        Review of Systems     Family History   Problem Relation Age of Onset    Heart disease Mother     Diabetes Mother     Heart disease Father     Diabetes Father     Diabetes Sister         Social History     Socioeconomic History    Marital status:    Tobacco Use    Smoking status: Every Day     Current packs/day: 1.00     Average packs/day: 1 pack/day for 38.2 years (38.2 ttl pk-yrs)     Types: Cigarettes     Start date: 1/1/1987     Passive exposure: Current    Smokeless tobacco: Never    Tobacco comments:     Currently smoking half pack daily. 03--js   Vaping Use    Vaping status: Never Used   Substance and Sexual Activity    Alcohol use: Not Currently    Drug use: Not Currently     Types: Methamphetamines, Heroin    Sexual activity: Defer        Past Medical History:   Diagnosis Date    Anxiety     Asthma     COPD (chronic obstructive pulmonary disease)     Shortness of breath     Substance abuse         Immunization History   Administered Date(s) Administered    Fluzone  >6mos 10/10/2024    Pneumococcal Conjugate 20-Valent (PCV20) 12/18/2024    Tdap 10/10/2024       No Known Allergies       Current Outpatient Medications:     budesonide-formoterol (SYMBICORT) 160-4.5 MCG/ACT inhaler, Inhale 2 puffs 2 (Two) Times a Day., Disp: 1  each, Rfl: 5    buprenorphine-naloxone (SUBOXONE) 8-2 MG per SL tablet, Place 1 tablet under the tongue Daily., Disp: , Rfl:     furosemide (LASIX) 40 MG tablet, Take 1 tablet by mouth Daily., Disp: 90 tablet, Rfl: 3    hydrOXYzine pamoate (VISTARIL) 25 MG capsule, Take 1 capsule by mouth 3 (Three) Times a Day As Needed for Anxiety., Disp: 90 capsule, Rfl: 3    ibuprofen (ADVIL,MOTRIN) 400 MG tablet, Take 1 tablet by mouth 3 times a day., Disp: , Rfl:     ipratropium-albuterol (DUO-NEB) 0.5-2.5 mg/3 ml nebulizer, Take 3 mL by nebulization Every 4 (Four) Hours As Needed for Wheezing., Disp: 360 mL, Rfl: 5    lidocaine (LIDODERM) 5 %, Place 1 patch on the skin as directed by provider Daily., Disp: 30 each, Rfl: 2    losartan (COZAAR) 25 MG tablet, Take 1 tablet by mouth Daily., Disp: 60 tablet, Rfl: 5    PARoxetine (PAXIL) 20 MG tablet, Take 1 tablet by mouth Daily., Disp: , Rfl:     prazosin (MINIPRESS) 1 MG capsule, Take 1 capsule by mouth every night at bedtime., Disp: , Rfl:     QUEtiapine (SEROquel) 100 MG tablet, Take 1 tablet by mouth every night at bedtime., Disp: , Rfl:     rosuvastatin (CRESTOR) 5 MG tablet, TAKE ONE TABLET BY MOUTH EVERY DAY, Disp: 90 tablet, Rfl: 0    spironolactone (ALDACTONE) 25 MG tablet, Take 1 tablet by mouth Daily., Disp: 90 tablet, Rfl: 3    Varenicline Tartrate, Starter, 0.5 MG X 11 & 1 MG X 42 tablet therapy pack, take per package instructions, Disp: , Rfl:     Ventolin  (90 Base) MCG/ACT inhaler, Inhale 2 puffs Every 4 (Four) Hours As Needed for Shortness of Air., Disp: 18 g, Rfl: 5    One Daily Womens tablet tablet, Take 1 tablet by mouth Daily. (Patient not taking: Reported on 3/20/2025), Disp: , Rfl:     predniSONE (DELTASONE) 20 MG tablet, Take 2 tablets by mouth Daily. (Patient not taking: Reported on 3/20/2025), Disp: 14 tablet, Rfl: 0    tiotropium (Spiriva HandiHaler) 18 MCG per inhalation capsule, Place 1 capsule into inhaler and inhale Daily for 30 days., Disp:  "30 capsule, Rfl: 5     Objective     Physical Exam  Vital Signs:   WDWN, Alert, NAD.    HEENT:  PERRL, EOMI.  OP, nares clear, no sinus tenderness  Neck:  Supple, no JVD, no thyromegaly.  Lymph: no axillary, cervical, supraclavicular lymphadenopathy noted bilaterally  Chest:  Mildly decreased breath sounds throughout with some expiratory wheezes. Normal work of breathing noted. Patient is able speak full sentences without difficulty.   CV: RRR, no MGR, pulses 2+, equal.  Abd:  Soft, NT, ND, + BS, no HSM  EXT:  no clubbing, no cyanosis, no edema, no joint tenderness  Neuro:  A&Ox3, CN grossly intact, no focal deficits.  Skin: No rashes or lesions noted.    /83 (BP Location: Left arm, Patient Position: Sitting, Cuff Size: Large Adult)   Pulse 75   Temp 98.2 °F (36.8 °C) (Oral)   Resp 16   Ht 170.2 cm (67.01\")   Wt 75.4 kg (166 lb 3.2 oz)   SpO2 96% Comment: room air  BMI 26.02 kg/m²         Result Review :   I have reviewed my last office visit note. I also reviewed chest xray report and labs dated from 1/28/2025. See scanned reports.     Procedures:    CMP          12/6/2024    09:29 1/28/2025    15:57   CMP   Glucose 92  91    BUN 18  8    Creatinine 0.92  0.76    EGFR 75.5  95.0    Sodium 138  137    Potassium 4.1  3.6    Chloride 100  99    Calcium 9.2  8.9    Total Protein 7.2  7.5    Albumin 3.5  3.8    Globulin 3.7  3.7    Total Bilirubin 0.4  0.5    Alkaline Phosphatase 94  168    AST (SGOT) 33  124    ALT (SGPT) 64  107    Albumin/Globulin Ratio 0.9  1.0    BUN/Creatinine Ratio 19.6  10.5    Anion Gap 6.5  8.3      CBC          12/6/2024    09:29 1/28/2025    15:57   CBC   WBC 5.14  8.97    RBC 4.47  4.44    Hemoglobin 13.7  13.7    Hematocrit 41.5  41.6    MCV 92.8  93.7    MCH 30.6  30.9    MCHC 33.0  32.9    RDW 11.4  12.6    Platelets 191  265      CBC w/diff          12/6/2024    09:29 1/28/2025    15:57   CBC w/Diff   WBC 5.14  8.97    RBC 4.47  4.44    Hemoglobin 13.7  13.7    Hematocrit " 41.5  41.6    MCV 92.8  93.7    MCH 30.6  30.9    MCHC 33.0  32.9    RDW 11.4  12.6    Platelets 191  265    Neutrophil Rel % 43.2     Immature Granulocyte Rel % 0.2     Lymphocyte Rel % 43.0     Monocyte Rel % 6.2     Eosinophil Rel % 6.4     Basophil Rel % 1.0       XR Chest 2 View  Result Date: 1/29/2025  No acute infiltrate is appreciated. No cardiac enlargement.   Portions of this note were completed with a voice recognition program.  Electronically Signed By-Anupam Molina MD On:1/29/2025 12:24 AM          Assessment and Plan      Assessment:  1. 7 mm right upper lobe lung nodule.  Will need close follow-up via Fleischner criteria in this high risk patient  2. 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 FEV1 of 35% predicted on PFTs completed on 1/9/2025. Alpha 1 antitrypsin with a normal genotype of MM with a level of 171.  3. Chronic dyspnea   4.  Chronic cough  5. Peripheral eosinophilia.  6. Tobacco abuse of cigarettes ongoing.  Patient is enrolled in lung cancer screening by her primary care provider.        Plan:  1. Patient reports that she did not receive Spiriva from the pharmacy.  Will send Spiriva HandiHaler to pharmacy.  Patient is advised to continue Symbicort as prescribed and rinse mouth out after each use.  2.  Continue albuterol inhaler and DuoNeb nebulizer treatments as needed.  3.  Low-dose chest CT scan results are currently pending.  Will notify patient once results return.  4.  Patient reports that she has not received an appointment to go to pulmonary rehab yet.  Will send another message to our nurse navigator to check on the status of pulmonary rehab referral.    5.  Vaccination status:  patient reports they are up-to-date with flu and pneumonia vaccines.  Patient declines COVID-19 vaccination.  Discussed with patient the benefits of vaccination including decreased risk of severe illness, hospitalization and death related to flu, pneumonia, and COVID-19.   Patient verbalized understanding.  Patient is advised to continue to follow CDC recommendations such as social distancing, wearing a mask, and washing hands for least 20 seconds.  6.  Smoking status: patient is a current cigarette smoker.  I counseled the patient on smoking cessation.  I counseled the patient on the risks of continued smoking including the risk of lung cancer, head and neck cancer, renal cancer, heart disease, stroke, and early death.  Patient refuses nicotine replacement therapy or pharmacotherapy at this time.  Patient is advised to decrease the number of cigarettes they are smoking up until the point to where they can quit.  7.  Patient to call the office, 911, or go to the ER with new or worsening symptoms.  8.  Note given to patient in the office today to have off school until follow-up appointment.  9.  Follow-up in 3 months, sooner if needed.          Follow Up   Return in about 3 months (around 6/20/2025).  Patient was given instructions and counseling regarding her condition or for health maintenance advice. Please see specific information pulled into the AVS if appropriate.

## 2025-03-09 NOTE — PATIENT INSTRUCTIONS
"Smoking Tobacco Information, Adult  Smoking tobacco can be harmful to your health. Tobacco contains a toxic colorless chemical called nicotine. Nicotine causes changes in your brain that make you want more and more. This is called addiction. This can make it hard to stop smoking once you start. Tobacco also has other toxic chemicals that can hurt your body and raise your risk of many cancers.  Menthol or \"lite\" tobacco or cigarette brands are not safer than regular brands.  How can smoking tobacco affect me?  Smoking tobacco puts you at risk for:  Cancer. Smoking is most commonly associated with lung cancer, but can also lead to cancer in other parts of the body.  Chronic obstructive pulmonary disease (COPD). This is a long-term lung condition that makes it hard to breathe. It also gets worse over time.  High blood pressure (hypertension), heart disease, stroke, heart attack, and lung infections, such as pneumonia.  Cataracts. This is when the lenses in the eyes become clouded.  Digestive problems. This may include peptic ulcers, heartburn, and gastroesophageal reflux disease (GERD).  Oral health problems, such as gum disease, mouth sores, and tooth loss.  Loss of taste and smell.  Smoking also affects how you look and smell. Smoking may cause:  Wrinkles.  Yellow or stained teeth, fingers, and fingernails.  Bad breath.  Bad-smelling clothes and hair.  Smoking tobacco can also affect your social life, because:  It may be challenging to find places to smoke when away from home. Many workplaces, restaurants, hotels, and public places are tobacco-free.  Smoking is expensive. This is due to the cost of tobacco and the long-term costs of treating health problems from smoking.  Secondhand smoke may affect those around you. Secondhand smoke can cause lung cancer, breathing problems, and heart disease. Children of smokers have a higher risk for:  Sudden infant death syndrome (SIDS).  Ear infections.  Lung infections.  What " actions can I take to prevent health problems?  Quit smoking    Do not start smoking. Quit if you already smoke.  Do not replace cigarette smoking with vaping devices, such as e-cigarettes.  Make a plan to quit smoking and commit to it. Look for programs to help you, and ask your health care provider for recommendations and ideas. Set a date and write down all the reasons you want to quit.  Let your friends and family know you are quitting so they can help and support you. Consider finding friends who also want to quit. It can be easier to quit with someone else, so that you can support each other.  Talk with your health care provider about using nicotine replacement medicines to help you quit. These include gum, lozenges, patches, sprays, or pills.  If you try to quit but return to smoking, stay positive. It is common to slip up when you first quit, so take it one day at a time.  Be prepared for cravings. When you feel the urge to smoke, chew gum or suck on hard candy.  Lifestyle  Stay busy.  Take care of your body. Get plenty of exercise, eat a healthy diet, and drink plenty of water.  Find ways to manage your stress, such as meditation, yoga, exercise, or time spent with friends and family.  Ask your health care provider about having regular tests (screenings) to check for cancer. This may include blood tests, imaging tests, and other tests.  Where to find support  To get support to quit smoking, consider:  Asking your health care provider for more information and resources.  Joining a support group for people who want to quit smoking in your local community. There are many effective programs that may help you to quit.  Calling the smokefree.gov counselor helpline at -398-QUIT-NOW (1-362.968.2422).  Where to find more information  You may find more information about quitting smoking from:  Centers for Disease Control and Prevention: cdc.gov/tobacco  Smokefree.gov: smokefree.gov  American Lung Association:  [employer]  Contact a health care provider if:  You have problems breathing.  Your lips, nose, or fingers turn blue.  You have chest pain.  You are coughing up blood.  You feel like you will faint.  You have other health changes that cause you to worry.  Summary  Smoking tobacco can negatively affect your health, the health of those around you, your finances, and your social life.  Do not start smoking. Quit if you already smoke. If you need help quitting, ask your health care provider.  Consider joining a support group for people in your local community who want to quit smoking. There are many effective programs that may help you to quit.  This information is not intended to replace advice given to you by your health care provider. Make sure you discuss any questions you have with your health care provider.  Document Revised: 12/13/2022 Document Reviewed: 12/13/2022  [employer] Patient Education © 2024 [employer]Chronic Obstructive Pulmonary Disease Exacerbation    Chronic obstructive pulmonary disease (COPD) is a long-term (chronic) lung problem.  When you have COPD, it can feel harder to breathe in or out.  COPD exacerbation is a flare-up of symptoms when breathing gets worse and more treatment may be needed. Without treatment, flare-ups can be life-threatening. If they happen often, your lungs can become more damaged.  What are the causes?  Not taking your usual COPD medicines as told by your health care provider.  A cold or the flu, which can cause infection in your lungs.  Being exposed to things that make your breathing worse, such as:  Smoke.  Air pollution.  Fumes.  Dust.  Allergies.  Weather changes.  What are the signs or symptoms?  Symptoms do not get better or get worse even if you take your medicines as told by your provider. Symptoms may include:  More shortness of breath. You may only be able to speak one or two words at a time.  More coughing or mucus from your lungs.  More wheezing or  chest tightness.  Being more tired and having less energy.  Confusion.  How is this diagnosed?  This condition is diagnosed based on:  Symptoms that get worse.  Your medical history.  A physical exam.  You may also have tests, including:  A chest X-ray.  Blood or mucus tests.  How is this treated?  You may be able to stay home or you may need to go to the hospital. Treatment may include:  Taking medicines. These may include:  Inhalers. These have medicines in them that you breathe in. These may be more of what you already take or they may be new.  Steroids. These reduce inflammation in the airways. These may be inhaled, taken by mouth, or given in an IV.  Antibiotics. These treat infection.  Using oxygen.  Using a device to help you clear mucus.  Follow these instructions at home:  Medicines  Take your medicines only as told by your provider.  If you were given antibiotics or steroids, take them as told by your provider. Do not stop taking them even if you start to feel better.  Lifestyle  Several times a day, wash your hands with soap and water for at least 20 seconds.  If you cannot use soap and water, use hand .  This may help keep you from getting an infection.  Avoid being around crowds or people who are sick.  Do not smoke or use any products that contain nicotine or tobacco. If you need help quitting, ask your provider.  Return to your normal activities when your provider says that it's safe.  Use breathing methods to control your stress and catch your breath.  How is this prevented?  Follow your COPD action plan. The action plan tells you what to do if you're feeling good and what to do when you start feeling worse. Discuss the plan often with your provider.  Make sure you get all the shots, also called vaccines, that your provider recommends. Ask your provider about a flu shot and a pneumonia shot.  Use oxygen therapy if told by your provider. If you need home oxygen therapy, ask your provider how  often to check your oxygen level with a device called an oximeter.  Keep all follow-up visits to review your COPD action plan. Your provider will want to check on your condition often to keep you healthy and out of the hospital.  Contact a health care provider if:  Your COPD symptoms get worse.  You have a fever or chills.  You have trouble doing daily activities.  You have trouble breathing even when you are resting.  Get help right away if:  You are short of breath and cannot:  Talk in full sentences.  Do normal activities.  You have chest pain.  You feel confused.  These symptoms may be an emergency. Call 911 right away.  Do not wait to see if the symptoms will go away.  Do not drive yourself to the hospital.  This information is not intended to replace advice given to you by your health care provider. Make sure you discuss any questions you have with your health care provider.  Document Revised: 09/19/2024 Document Reviewed: 03/04/2024  [employer]vier Patient Education © 2024 [employer]

## 2025-03-11 RX ORDER — HYDROXYZINE PAMOATE 25 MG/1
25 CAPSULE ORAL 3 TIMES DAILY PRN
Qty: 90 CAPSULE | Refills: 3 | Status: SHIPPED | OUTPATIENT
Start: 2025-03-11

## 2025-03-17 ENCOUNTER — HOSPITAL ENCOUNTER (OUTPATIENT)
Dept: CT IMAGING | Facility: HOSPITAL | Age: 50
Discharge: HOME | End: 2025-03-17
Payer: MEDICAID

## 2025-03-17 DIAGNOSIS — R91.8 PULMONARY NODULES: ICD-10-CM

## 2025-03-17 PROCEDURE — 71250 CT THORAX DX C-: CPT

## 2025-03-18 RX ORDER — ROSUVASTATIN CALCIUM 5 MG/1
5 TABLET, COATED ORAL DAILY
Qty: 90 TABLET | Refills: 0 | Status: SHIPPED | OUTPATIENT
Start: 2025-03-18

## 2025-03-20 ENCOUNTER — OFFICE VISIT (OUTPATIENT)
Dept: PULMONOLOGY | Facility: CLINIC | Age: 50
End: 2025-03-20
Payer: MEDICAID

## 2025-03-20 ENCOUNTER — TELEPHONE (OUTPATIENT)
Dept: PULMONOLOGY | Facility: CLINIC | Age: 50
End: 2025-03-20

## 2025-03-20 VITALS
WEIGHT: 166.2 LBS | HEART RATE: 75 BPM | TEMPERATURE: 98.2 F | SYSTOLIC BLOOD PRESSURE: 138 MMHG | DIASTOLIC BLOOD PRESSURE: 83 MMHG | OXYGEN SATURATION: 96 % | BODY MASS INDEX: 26.09 KG/M2 | HEIGHT: 67 IN | RESPIRATION RATE: 16 BRPM

## 2025-03-20 DIAGNOSIS — R91.1 LUNG NODULE: ICD-10-CM

## 2025-03-20 DIAGNOSIS — J44.89 ASTHMA-COPD OVERLAP SYNDROME: ICD-10-CM

## 2025-03-20 DIAGNOSIS — Z72.0 TOBACCO ABUSE: Primary | ICD-10-CM

## 2025-03-20 DIAGNOSIS — J11.1 INFLUENZA: ICD-10-CM

## 2025-03-20 DIAGNOSIS — R06.09 CHRONIC DYSPNEA: ICD-10-CM

## 2025-03-20 DIAGNOSIS — D72.19 PERIPHERAL EOSINOPHILIA: ICD-10-CM

## 2025-03-20 DIAGNOSIS — R05.3 CHRONIC COUGH: ICD-10-CM

## 2025-03-20 DIAGNOSIS — R91.1 SOLITARY LUNG NODULE: ICD-10-CM

## 2025-03-20 RX ORDER — IPRATROPIUM BROMIDE AND ALBUTEROL SULFATE 2.5; .5 MG/3ML; MG/3ML
3 SOLUTION RESPIRATORY (INHALATION) EVERY 4 HOURS PRN
Qty: 360 ML | Refills: 5 | Status: SHIPPED | OUTPATIENT
Start: 2025-03-20

## 2025-03-20 RX ORDER — TIOTROPIUM BROMIDE 18 UG/1
1 CAPSULE ORAL; RESPIRATORY (INHALATION)
Qty: 30 CAPSULE | Refills: 5 | Status: SHIPPED | OUTPATIENT
Start: 2025-03-20 | End: 2025-04-19

## 2025-03-20 RX ORDER — PRAZOSIN HYDROCHLORIDE 1 MG/1
1 CAPSULE ORAL
COMMUNITY
Start: 2025-03-05

## 2025-03-20 RX ORDER — ALBUTEROL SULFATE 90 UG/1
2 AEROSOL, METERED RESPIRATORY (INHALATION) EVERY 4 HOURS PRN
Qty: 18 G | Refills: 5 | Status: SHIPPED | OUTPATIENT
Start: 2025-03-20

## 2025-03-20 RX ORDER — BUDESONIDE AND FORMOTEROL FUMARATE DIHYDRATE 160; 4.5 UG/1; UG/1
2 AEROSOL RESPIRATORY (INHALATION) 2 TIMES DAILY
Qty: 1 EACH | Refills: 5 | Status: SHIPPED | OUTPATIENT
Start: 2025-03-20

## 2025-03-20 RX ORDER — VARENICLINE TARTRATE 0.5 (11)-1
KIT ORAL
COMMUNITY
Start: 2025-03-05

## 2025-03-20 NOTE — TELEPHONE ENCOUNTER
Message was sent on 1/28/2025 to refer pt to pulmonary rehab and patient still has not received appointment yet. Can you please check on this and call the patient. Thank you.

## 2025-03-21 ENCOUNTER — RESULTS FOLLOW-UP (OUTPATIENT)
Dept: CT IMAGING | Facility: HOSPITAL | Age: 50
End: 2025-03-21
Payer: MEDICAID

## 2025-03-21 DIAGNOSIS — R79.89 ELEVATED LFTS: ICD-10-CM

## 2025-03-21 DIAGNOSIS — R19.8 ABNORMAL FINDING OF BILIARY TRACT: Primary | ICD-10-CM

## 2025-04-03 ENCOUNTER — HOSPITAL ENCOUNTER (OUTPATIENT)
Dept: CT IMAGING | Facility: HOSPITAL | Age: 50
Discharge: HOME | End: 2025-04-03
Payer: MEDICAID

## 2025-04-03 DIAGNOSIS — R79.89 ELEVATED LFTS: ICD-10-CM

## 2025-04-03 DIAGNOSIS — R19.8 ABNORMAL FINDING OF BILIARY TRACT: ICD-10-CM

## 2025-04-03 PROCEDURE — 25510000001 IOPAMIDOL PER 1 ML

## 2025-04-03 PROCEDURE — 74170 CT ABD WO CNTRST FLWD CNTRST: CPT

## 2025-04-03 RX ORDER — IOPAMIDOL 755 MG/ML
100 INJECTION, SOLUTION INTRAVASCULAR
Status: COMPLETED | OUTPATIENT
Start: 2025-04-03 | End: 2025-04-03

## 2025-04-03 RX ADMIN — IOPAMIDOL 90 ML: 755 INJECTION, SOLUTION INTRAVENOUS at 15:25

## 2025-04-14 ENCOUNTER — TELEPHONE (OUTPATIENT)
Dept: GASTROENTEROLOGY | Facility: CLINIC | Age: 50
End: 2025-04-14
Payer: MEDICAID

## 2025-04-14 NOTE — TELEPHONE ENCOUNTER
Left voice message for patient to return call regarding rescheduling patient appointment on 5/29/25, due to Donna Lan being out of the office. Can be scheduled any day if she isn't over 20. Per Donna.

## 2025-04-17 DIAGNOSIS — R06.09 CHRONIC DYSPNEA: Primary | ICD-10-CM

## 2025-04-17 NOTE — TELEPHONE ENCOUNTER
Patient called back and was notified of her appointment change. Patient verbalized understanding.

## 2025-04-17 NOTE — TELEPHONE ENCOUNTER
Left voice message for patient to return call regarding her appointment on 5/29/25 with  Donna Lan being out of the office. He appointment has been rescheduled to 5/21/25@2:00. Sending a letter.

## 2025-04-25 DIAGNOSIS — J45.909 ASTHMA, UNSPECIFIED ASTHMA SEVERITY, UNSPECIFIED WHETHER COMPLICATED, UNSPECIFIED WHETHER PERSISTENT: ICD-10-CM

## 2025-04-25 DIAGNOSIS — J44.9 CHRONIC OBSTRUCTIVE PULMONARY DISEASE, UNSPECIFIED COPD TYPE: Primary | ICD-10-CM

## 2025-04-28 ENCOUNTER — OFFICE VISIT (OUTPATIENT)
Dept: CARDIOLOGY | Facility: CLINIC | Age: 50
End: 2025-04-28
Payer: MEDICAID

## 2025-04-28 VITALS
SYSTOLIC BLOOD PRESSURE: 140 MMHG | WEIGHT: 162 LBS | BODY MASS INDEX: 25.43 KG/M2 | HEART RATE: 82 BPM | DIASTOLIC BLOOD PRESSURE: 82 MMHG | HEIGHT: 67 IN

## 2025-04-28 DIAGNOSIS — R00.2 PALPITATIONS: ICD-10-CM

## 2025-04-28 DIAGNOSIS — E78.2 MIXED HYPERLIPIDEMIA: ICD-10-CM

## 2025-04-28 DIAGNOSIS — I10 PRIMARY HYPERTENSION: ICD-10-CM

## 2025-04-28 DIAGNOSIS — Z72.0 TOBACCO ABUSE: ICD-10-CM

## 2025-04-28 DIAGNOSIS — R06.09 DYSPNEA ON EXERTION: Primary | ICD-10-CM

## 2025-04-28 PROCEDURE — 99214 OFFICE O/P EST MOD 30 MIN: CPT | Performed by: INTERNAL MEDICINE

## 2025-04-28 NOTE — PROGRESS NOTES
Chief Complaint  3 month follow up  and Congestive Heart Failure    Subjective        Analisa Willett presents to [facility]  History of present illness:    Patient still notes dyspnea on exertion.  She is seeing the lung doctors after having PFTs done that showed severe COPD with FEV1 of 35% predicted.  She notes no real chest pain.  She does note that her heart races when she is up walking around.  She has cut down to 6 cigarettes/day.      Past Medical History:   Diagnosis Date    Anxiety     Asthma     COPD (chronic obstructive pulmonary disease)     Shortness of breath     Substance abuse          Past Surgical History:   Procedure Laterality Date    CHOLECYSTECTOMY            Social History     Socioeconomic History    Marital status:    Tobacco Use    Smoking status: Every Day     Current packs/day: 1.00     Average packs/day: 1 pack/day for 38.3 years (38.3 ttl pk-yrs)     Types: Cigarettes     Start date: 1/1/1987     Passive exposure: Current    Smokeless tobacco: Never    Tobacco comments:     Currently smoking half pack daily. 03--js   Vaping Use    Vaping status: Never Used   Substance and Sexual Activity    Alcohol use: Not Currently    Drug use: Not Currently     Types: Methamphetamines, Heroin    Sexual activity: Defer         Family History   Problem Relation Age of Onset    Heart disease Mother     Diabetes Mother     Heart disease Father     Diabetes Father     Diabetes Sister           No Known Allergies         Current Outpatient Medications:     budesonide-formoterol (SYMBICORT) 160-4.5 MCG/ACT inhaler, Inhale 2 puffs 2 (Two) Times a Day., Disp: 1 each, Rfl: 5    buprenorphine-naloxone (SUBOXONE) 8-2 MG per SL tablet, Place 1 tablet under the tongue Daily., Disp: , Rfl:     furosemide (LASIX) 40 MG tablet, Take 1 tablet by mouth Daily., Disp: 90 tablet, Rfl: 3    hydrOXYzine pamoate (VISTARIL) 25 MG capsule, Take 1 capsule by mouth 3 (Three) Times a Day  "As Needed for Anxiety., Disp: 90 capsule, Rfl: 3    ibuprofen (ADVIL,MOTRIN) 400 MG tablet, Take 1 tablet by mouth 3 times a day., Disp: , Rfl:     ipratropium-albuterol (DUO-NEB) 0.5-2.5 mg/3 ml nebulizer, Take 3 mL by nebulization Every 4 (Four) Hours As Needed for Wheezing., Disp: 360 mL, Rfl: 5    lidocaine (LIDODERM) 5 %, Place 1 patch on the skin as directed by provider Daily., Disp: 30 each, Rfl: 2    losartan (COZAAR) 25 MG tablet, Take 1 tablet by mouth Daily., Disp: 60 tablet, Rfl: 5    PARoxetine (PAXIL) 20 MG tablet, Take 1 tablet by mouth Daily., Disp: , Rfl:     prazosin (MINIPRESS) 1 MG capsule, Take 1 capsule by mouth every night at bedtime., Disp: , Rfl:     QUEtiapine (SEROquel) 100 MG tablet, Take 1 tablet by mouth every night at bedtime., Disp: , Rfl:     rosuvastatin (CRESTOR) 5 MG tablet, TAKE ONE TABLET BY MOUTH EVERY DAY, Disp: 90 tablet, Rfl: 0    spironolactone (ALDACTONE) 25 MG tablet, Take 1 tablet by mouth Daily., Disp: 90 tablet, Rfl: 3    tiotropium (Spiriva HandiHaler) 18 MCG per inhalation capsule, Place 1 capsule into inhaler and inhale Daily for 30 days., Disp: 30 capsule, Rfl: 5    Varenicline Tartrate, Starter, 0.5 MG X 11 & 1 MG X 42 tablet therapy pack, take per package instructions, Disp: , Rfl:     Ventolin  (90 Base) MCG/ACT inhaler, Inhale 2 puffs Every 4 (Four) Hours As Needed for Shortness of Air., Disp: 18 g, Rfl: 5    One Daily Womens tablet tablet, Take 1 tablet by mouth Daily. (Patient not taking: Reported on 4/28/2025), Disp: , Rfl:       ROS:  Cardiac review of systems positive for dyspnea on exertion and exertional palpitations.    Objective     /82   Pulse 82   Ht 170.2 cm (67.01\")   Wt 73.5 kg (162 lb)   BMI 25.37 kg/m²       General Appearance:   well developed  well nourished  HENT:   oropharynx moist  lips not cyanotic  Respiratory:  no respiratory distress  normal breath sounds  no rales  Cardiovascular:  no jugular venous distention  regular " rhythm  S1 normal, S2 normal  no S3, no S4   no murmur  no rub, no thrill  No carotid bruit  pedal pulses normal  lower extremity edema: none    Musculoskeletal:  no clubbing of fingers.   normocephalic, head atraumatic  Skin:   warm, dry  Psychiatric:  judgement and insight appropriate  normal mood and affect    ECHO:  Results for orders placed during the hospital encounter of 02/14/25    Adult Transthoracic Echo Complete W/ Cont if Necessary Per Protocol    Interpretation Summary  Normal chamber sizes.  LV has normal wall thickness.  No regional wall motion abnormalities are present.  Systolic function is normal calculated LVEF is greater than 55%.  Diastolic function is normal.  RV has normal systolic function.  Trileaflet aortic valve.  There is no aortic valve stenosis/regurgitation.  Mitral valve has thickened leaflets with preserved leaflet excursion.  Trace MR is noted  Grossly normal tricuspid valve.  Mild TR is noted.  Calculated RVSP is 36 mmHg plus right atrial pressure  No pericardial effusion is noted.  Aortic root has normal dimensions.  IVC has normal size.  Estimated right atrial pressure is 0 to 5 mmHg    No prior studies available for comparison    STRESS:    CATH:  No results found for this or any previous visit.    BMP:     Glucose   Date Value Ref Range Status   01/28/2025 91 65 - 99 mg/dL Final     BUN   Date Value Ref Range Status   01/28/2025 8 6 - 20 mg/dL Final     Creatinine   Date Value Ref Range Status   01/28/2025 0.76 0.57 - 1.00 mg/dL Final     Sodium   Date Value Ref Range Status   01/28/2025 137 136 - 145 mmol/L Final     Potassium   Date Value Ref Range Status   01/28/2025 3.6 3.5 - 5.2 mmol/L Final     Chloride   Date Value Ref Range Status   01/28/2025 99 98 - 107 mmol/L Final     CO2   Date Value Ref Range Status   01/28/2025 29.7 (H) 22.0 - 29.0 mmol/L Final     Calcium   Date Value Ref Range Status   01/28/2025 8.9 8.6 - 10.5 mg/dL Final     BUN/Creatinine Ratio   Date Value  Ref Range Status   01/28/2025 10.5 7.0 - 25.0 Final     Anion Gap   Date Value Ref Range Status   01/28/2025 8.3 5.0 - 15.0 mmol/L Final     eGFR   Date Value Ref Range Status   01/28/2025 95.0 >60.0 mL/min/1.73 Final     LIPIDS:  Total Cholesterol   Date Value Ref Range Status   12/06/2024 226 (H) 0 - 200 mg/dL Final     Triglycerides   Date Value Ref Range Status   12/06/2024 62 0 - 150 mg/dL Final     HDL Cholesterol   Date Value Ref Range Status   12/06/2024 57 40 - 60 mg/dL Final     LDL Cholesterol    Date Value Ref Range Status   12/06/2024 158 (H) 0 - 100 mg/dL Final     VLDL Cholesterol   Date Value Ref Range Status   12/06/2024 11 5 - 40 mg/dL Final     LDL/HDL Ratio   Date Value Ref Range Status   12/06/2024 2.75  Final         Procedures             ASSESSMENT:  Diagnoses and all orders for this visit:    1. Dyspnea on exertion (Primary)    2. Palpitations    3. Primary hypertension    4. Mixed hyperlipidemia    5. Tobacco abuse         PLAN:    1.  Patient had PFTs done that showed severe COPD with an FEV1 of 35% predicted.  She is following with pulmonology.  2.  Patient had echocardiogram 2/16/2025 which showed normal heart structure.  3.  We are going to wait till she sees the pulmonologist again in June.  She does have a strong family history of coronary artery disease along with hypertension, hyperlipidemia and tobacco abuse.  When she comes back in July we may consider a Lexiscan Cardiolite depending on how she is doing at that time.  4.  Encouraged continued cutting down on smoking with goal of quitting.  She does have Chantix and I talked to her about this medication and she is in agreement to start it.  We have talked about potential side effects.  5.  Blood pressure is borderline but last 3 times has been fine.  We will continue to monitor it.  6.  Continue the Crestor.      Return in about 2 months (around 7/9/2025) for kehinde lowery.     Patient was given instructions and  counseling regarding her condition or for health maintenance advice. Please see specific information pulled into the AVS if appropriate.         Romie Kamara MD   4/28/2025  15:13 EDT

## 2025-05-12 ENCOUNTER — HOSPITAL ENCOUNTER (OUTPATIENT)
Dept: MRI IMAGING | Facility: HOSPITAL | Age: 50
Discharge: HOME | End: 2025-05-12
Payer: MEDICAID

## 2025-05-12 DIAGNOSIS — R79.89 ELEVATED LFTS: ICD-10-CM

## 2025-05-12 DIAGNOSIS — R19.8 ABNORMAL FINDING OF BILIARY TRACT: ICD-10-CM

## 2025-05-12 PROCEDURE — 25510000002 GADOBENATE DIMEGLUMINE 529 MG/ML SOLUTION

## 2025-05-12 PROCEDURE — A9577 INJ MULTIHANCE: HCPCS

## 2025-05-12 PROCEDURE — 74183 MRI ABD W/O CNTR FLWD CNTR: CPT

## 2025-05-12 RX ADMIN — GADOBENATE DIMEGLUMINE 15 ML: 529 INJECTION, SOLUTION INTRAVENOUS at 12:08

## 2025-05-21 ENCOUNTER — OFFICE VISIT (OUTPATIENT)
Dept: GASTROENTEROLOGY | Facility: CLINIC | Age: 50
End: 2025-05-21
Payer: MEDICAID

## 2025-05-21 VITALS — BODY MASS INDEX: 26.21 KG/M2 | WEIGHT: 167 LBS | HEIGHT: 67 IN

## 2025-05-21 DIAGNOSIS — R93.5 ABNORMAL FINDINGS ON DIAGNOSTIC IMAGING OF ABDOMEN: ICD-10-CM

## 2025-05-21 DIAGNOSIS — R79.89 ELEVATED LIVER FUNCTION TESTS: Primary | ICD-10-CM

## 2025-05-21 DIAGNOSIS — K59.00 CONSTIPATION, UNSPECIFIED CONSTIPATION TYPE: ICD-10-CM

## 2025-05-21 NOTE — PROGRESS NOTES
Chief Complaint        Elevated Hepatic Enzymes and Abdominal Pain    History of Present Illness      Analisa Willett is a 49 y.o. female who presents to [facility] as a new patient abnormal imaging.      History of Present Illness  The patient is a 49-year-old female who presents today as a new patient to our clinic.    She had a CT scan of the abdomen and pelvis done with her PCP on 04/03/2025 for elevated liver function tests (LFTs) and biliary duct dilatation seen on a previous chest CT. The CT scan with and without contrast showed previous cholecystectomy, intra and extrahepatic biliary dilation, and an extrahepatic common bile duct measuring up to about 1.2 cm. The biliary dilation is greater than expected for a post-cholecystectomy patient. There appears to be a cystic structure adjacent and continuous with the distal common bile duct. Differential diagnoses may include bile duct diverticulum or choledocholithiasis, cystic lesion arising from the pancreas or pancreatic duct. Duodenal diverticulum is less likely. Follow-up MRI of the abdomen with contrast and MRCP were recommended for further evaluation. Moderate atherosclerosis in the abdominal aorta was noted. She then had an MRCP done on 05/12/2025 that shows moderately dilated and tortuous appearance of the biliary tree without visualized mass or filling defect to suggest choledocholithiasis. There is a slightly blunted appearance at the ampulla. Differential diagnoses include biliary sphincter of Oddi dysfunction, ampullary stricture, or possible occult ampullary lesion. Consider ERCP based on clinical factors. No acute MRI findings, other ancillary findings as above.    Her most recent labs done on 01/28/2025 showed elevated ALT at 107, AST at 124, and alkaline phosphatase at 168. CBC was normal at that time, and hemoglobin A1c was normal. Cologuard was negative about 6 months ago.    She reports no history of reflux,  heartburn, nausea, or vomiting. However, she has been experiencing intermittent sharp, deep-seated pain in the right upper quadrant for several months. 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 She does not use marijuana. She has never undergone an EGD or colonoscopy. She recalls a previous scan indicating an issue with her esophagus. She has been informed of elevated liver enzymes since 2016.    Her bowel movements are irregular, occurring every couple of days, and she experiences feelings of constipation, gas, and bloating.    PAST SURGICAL HISTORY:  Cholecystectomy in 2016.    SOCIAL HISTORY  Alcohol: In recovery from alcohol use.  Tobacco: Currently smokes 6-8 cigarettes a day, down from over a pack.  Recreational Drugs: Does not use marijuana.    FAMILY HISTORY  - Negative for colon cancer, rectal cancer, stomach cancer, liver cancer, and pancreatic cancer in first-degree relatives.      Results       Result Review :   The following data was reviewed by: YOLA Gastelum on 05/21/2025     CMP          12/6/2024    09:29 1/28/2025    15:57   CMP   Glucose 92  91    BUN 18  8    Creatinine 0.92  0.76    EGFR 75.5  95.0    Sodium 138  137    Potassium 4.1  3.6    Chloride 100  99    Calcium 9.2  8.9    Total Protein 7.2  7.5    Albumin 3.5  3.8    Globulin 3.7  3.7    Total Bilirubin 0.4  0.5    Alkaline Phosphatase 94  168    AST (SGOT) 33  124    ALT (SGPT) 64  107    Albumin/Globulin Ratio 0.9  1.0    BUN/Creatinine Ratio 19.6  10.5    Anion Gap 6.5  8.3      CBC          12/6/2024    09:29 1/28/2025    15:57   CBC   WBC 5.14  8.97    RBC 4.47  4.44    Hemoglobin 13.7  13.7    Hematocrit 41.5  41.6    MCV 92.8  93.7    MCH 30.6  30.9    MCHC 33.0  32.9    RDW 11.4  12.6    Platelets 191  265      CBC w/diff          12/6/2024    09:29 1/28/2025    15:57   CBC w/Diff   WBC 5.14  8.97  "   RBC 4.47  4.44    Hemoglobin 13.7  13.7    Hematocrit 41.5  41.6    MCV 92.8  93.7    MCH 30.6  30.9    MCHC 33.0  32.9    RDW 11.4  12.6    Platelets 191  265    Neutrophil Rel % 43.2     Immature Granulocyte Rel % 0.2     Lymphocyte Rel % 43.0     Monocyte Rel % 6.2     Eosinophil Rel % 6.4     Basophil Rel % 1.0       Lipid Panel          12/6/2024    09:29   Lipid Panel   Total Cholesterol 226    Triglycerides 62    HDL Cholesterol 57    VLDL Cholesterol 11    LDL Cholesterol  158    LDL/HDL Ratio 2.75      TSH          12/6/2024    09:29   TSH   TSH 1.650      Electrolytes          12/6/2024    09:29 1/28/2025    15:57   Electrolytes   Sodium 138  137    Potassium 4.1  3.6    Chloride 100  99    Calcium 9.2  8.9      Renal Profile          12/6/2024    09:29 1/28/2025    15:57   Renal Profile   BUN 18  8    Creatinine 0.92  0.76        Lipase No results found for: \"LIPASE\"  Amylase No results found for: \"AMYLASE\"  Iron Profile No results found for: \"IRON\", \"TIBC\", \"LABIRON\", \"TRANSFERRIN\"  Ferritin No results found for: \"FERRITIN\"  ESR (Sed Rate) No results found for: \"SEDRATE\"  CRP (C-Reactive) No results found for: \"CRP\"  Liver Workup   ALPHA -1 ANTITRYPSIN   Date Value Ref Range Status   01/28/2025 191 90 - 200 mg/dL Final     Acute HEP Panel   Hepatitis C Ab   Date Value Ref Range Status   12/06/2024 Non-Reactive Non-Reactive Final     Alpha 1   ALPHA -1 ANTITRYPSIN   Date Value Ref Range Status   01/28/2025 191 90 - 200 mg/dL Final            Results  Labs   - ALT: 01/28/2025, 107   - AST: 01/28/2025, 124   - Alkaline Phosphatase: 01/28/2025, 168   - Hemoglobin A1c: 01/28/2025, Normal   - Cologuard: 10/2024, Negative    Imaging   - CT scan of the abdomen and pelvis: 04/03/2025, Previous cholecystectomy, intra and extrahepatic biliary dilation. Extrahepatic common bile duct measures up to about 1.2 cm. The biliary dilation is greater than expected for post-cholecystectomy patient. There appears to be a " cystic structure adjacent and continuous with the distal common bile duct. Differential may include bile duct diverticulum or choledocholithiasis cystic lesion arising from the pancreas or pancreatic duct in the differential. Duodenal diverticulum less likely. Moderate atherosclerosis in the abdominal aorta noted.   - MRCP: 05/12/2025, Moderately dilated and tortuous appearance of the biliary tree without visualized mass or filling defect to suggest choledocholithiasis. Slightly blunted appearance at the ampulla. Differential includes biliary sphincter of Oddi dysfunction, ampullary stricture versus possible occult ampullary lesion.      Past Medical History       Past Medical History:   Diagnosis Date    Anxiety     Asthma 2019    CHF (congestive heart failure) 2019    COPD (chronic obstructive pulmonary disease)     Coronary artery disease 2019    Heart valve disease 2024    Hyperlipidemia 2024    Shortness of breath     Substance abuse        Past Surgical History:   Procedure Laterality Date    CHOLECYSTECTOMY           Current Outpatient Medications:     budesonide-formoterol (SYMBICORT) 160-4.5 MCG/ACT inhaler, Inhale 2 puffs 2 (Two) Times a Day., Disp: 1 each, Rfl: 5    buprenorphine-naloxone (SUBOXONE) 8-2 MG per SL tablet, Place 1 tablet under the tongue Daily., Disp: , Rfl:     furosemide (LASIX) 40 MG tablet, Take 1 tablet by mouth Daily., Disp: 90 tablet, Rfl: 3    hydrOXYzine pamoate (VISTARIL) 25 MG capsule, Take 1 capsule by mouth 3 (Three) Times a Day As Needed for Anxiety., Disp: 90 capsule, Rfl: 3    ibuprofen (ADVIL,MOTRIN) 400 MG tablet, Take 1 tablet by mouth 3 times a day., Disp: , Rfl:     ipratropium-albuterol (DUO-NEB) 0.5-2.5 mg/3 ml nebulizer, Take 3 mL by nebulization Every 4 (Four) Hours As Needed for Wheezing., Disp: 360 mL, Rfl: 5    lidocaine (LIDODERM) 5 %, Place 1 patch on the skin as directed by provider Daily., Disp: 30 each, Rfl: 2    losartan (COZAAR) 25 MG tablet, Take 1 tablet  "by mouth Daily., Disp: 60 tablet, Rfl: 5    One Daily Womens tablet tablet, Take 1 tablet by mouth Daily., Disp: , Rfl:     PARoxetine (PAXIL) 20 MG tablet, Take 1 tablet by mouth Daily., Disp: , Rfl:     prazosin (MINIPRESS) 1 MG capsule, Take 1 capsule by mouth every night at bedtime., Disp: , Rfl:     QUEtiapine (SEROquel) 100 MG tablet, Take 1 tablet by mouth every night at bedtime., Disp: , Rfl:     rosuvastatin (CRESTOR) 5 MG tablet, TAKE ONE TABLET BY MOUTH EVERY DAY, Disp: 90 tablet, Rfl: 0    spironolactone (ALDACTONE) 25 MG tablet, Take 1 tablet by mouth Daily., Disp: 90 tablet, Rfl: 3    Varenicline Tartrate, Starter, 0.5 MG X 11 & 1 MG X 42 tablet therapy pack, take per package instructions, Disp: , Rfl:     Ventolin  (90 Base) MCG/ACT inhaler, Inhale 2 puffs Every 4 (Four) Hours As Needed for Shortness of Air., Disp: 18 g, Rfl: 5    tiotropium (Spiriva HandiHaler) 18 MCG per inhalation capsule, Place 1 capsule into inhaler and inhale Daily for 30 days., Disp: 30 capsule, Rfl: 5     No Known Allergies    Family History   Problem Relation Age of Onset    Heart disease Mother     Diabetes Mother     Heart failure Mother     Heart disease Father     Diabetes Father     Heart failure Father     Diabetes Sister         Social History     Social History Narrative    Not on file       Objective       Objective     Vital Signs:   Ht 170.2 cm (67\")   Wt 75.8 kg (167 lb)   BMI 26.16 kg/m²     Body mass index is 26.16 kg/m².    Physical Exam  Constitutional:       General: She is not in acute distress.     Appearance: She is well-developed. She is not ill-appearing.   HENT:      Head: Normocephalic.   Eyes:      Pupils: Pupils are equal, round, and reactive to light.   Cardiovascular:      Rate and Rhythm: Normal rate and regular rhythm.      Heart sounds: Normal heart sounds.   Pulmonary:      Effort: Pulmonary effort is normal.      Breath sounds: Normal breath sounds.   Abdominal:      General: Bowel " sounds are normal. There is distension.      Palpations: Abdomen is soft. There is no mass.      Tenderness: There is abdominal tenderness. There is no guarding or rebound.      Hernia: No hernia is present.       Musculoskeletal:         General: Normal range of motion.   Skin:     General: Skin is warm and dry.   Neurological:      Mental Status: She is alert and oriented to person, place, and time.   Psychiatric:         Speech: Speech normal.         Behavior: Behavior normal.         Judgment: Judgment normal.         Physical Exam  Abdomen: Moderate atherosclerosis in the abdominal aorta noted. No acute MRI findings.  Musculoskeletal: Multilevel spine degenerative changes noted.           Assessment & Plan          Assessment and Plan    Diagnoses and all orders for this visit:    1. Elevated liver function tests (Primary)  -     Comprehensive Metabolic Panel  -     CBC & Differential  -     Lipase  -     ARNOL  -     Anti-Smooth Muscle Antibody Titer  -     Ceruloplasmin  -     Ferritin  -     Immunofixation, Serum  -     Iron Profile  -     Mitochondrial Antibodies, M2  -     Hepatitis Panel, Acute    2. Abnormal findings on diagnostic imaging of abdomen  -     Comprehensive Metabolic Panel  -     CBC & Differential  -     Lipase  -     ARNOL  -     Anti-Smooth Muscle Antibody Titer  -     Ceruloplasmin  -     Ferritin  -     Immunofixation, Serum  -     Iron Profile  -     Mitochondrial Antibodies, M2  -     Hepatitis Panel, Acute    3. Constipation, unspecified constipation type        Assessment & Plan  1. Biliary duct dilatation:  - MRI revealed a dilated and tortuous biliary tree without a visualized mass or filling defect to suggest choledocholithiasis.  - Differential diagnosis includes biliary sphincter of Oddi dysfunction, ampullary stricture, or a possible occult ampullary lesion.  - Consider ERCP based on clinical factors.  - Referral to Dr. Ailyn Shrestha for further evaluation and potential  ERCP.    2. Elevated liver function tests (LFTs):  - Most recent labs from 01/28/2025 showed elevated ALT at 107, AST at 124, and alkaline phosphatase at 168.  - CMP, CBC, lipase level, and liver enzymes will be ordered to assess the current status.    3. Constipation:  - Reports infrequent bowel movements, sometimes every couple of days, and feels backed up, gassy, and bloated.  - Daily use of MiraLAX recommended to alleviate constipation and reduce pressure in the abdomen.    4. Health maintenance:  - Negative Cologuard test in 10/2024.  - Hemoglobin A1c levels were normal as of 01/28/2025.              Follow Up       Follow Up   Return in about 6 weeks (around 7/2/2025) for ELEVATED LIVER ENZYMES, CONSTIPATION, ABDOMINAL PAIN.  Patient was given instructions and counseling regarding her condition or for health maintenance advice. Please see specific information pulled into the AVS if appropriate.       Patient or patient representative verbalized consent for the use of Ambient Listening during the visit with  YOLA Gastelum for chart documentation. 5/21/2025  14:57 EDT

## 2025-05-21 NOTE — Clinical Note
Seen as a new patient for elevated liver enzymes and abnormal imaging.  Please review her imaging and see if you think she is a candidate for ERCP.  Thanks

## 2025-05-22 ENCOUNTER — OFFICE VISIT (OUTPATIENT)
Dept: OBSTETRICS AND GYNECOLOGY | Age: 50
End: 2025-05-22
Payer: MEDICAID

## 2025-05-22 ENCOUNTER — TELEPHONE (OUTPATIENT)
Dept: GASTROENTEROLOGY | Facility: CLINIC | Age: 50
End: 2025-05-22
Payer: MEDICAID

## 2025-05-22 VITALS
SYSTOLIC BLOOD PRESSURE: 124 MMHG | BODY MASS INDEX: 25.74 KG/M2 | HEIGHT: 67 IN | WEIGHT: 164 LBS | DIASTOLIC BLOOD PRESSURE: 70 MMHG | HEART RATE: 75 BPM

## 2025-05-22 DIAGNOSIS — Z01.419 ENCOUNTER FOR GYNECOLOGICAL EXAMINATION WITHOUT ABNORMAL FINDING: Primary | ICD-10-CM

## 2025-05-22 PROCEDURE — G0123 SCREEN CERV/VAG THIN LAYER: HCPCS | Performed by: OBSTETRICS & GYNECOLOGY

## 2025-05-22 PROCEDURE — 87624 HPV HI-RISK TYP POOLED RSLT: CPT | Performed by: OBSTETRICS & GYNECOLOGY

## 2025-05-22 NOTE — TELEPHONE ENCOUNTER
----- Message from Donna Lan sent at 5/22/2025 11:50 AM EDT -----  Please call the patient and let her know I did discuss her case with Dr. Shrestha.  She feels like from where the issues are in the pancreas at the ampulla the patient would be better suited to see Dr. Lea for possible EUS/ERCP in [place].  If she is agreeable I will place the referral.  ----- Message -----  From: Karen Shrestha MD  Sent: 5/21/2025   6:25 PM EDT  To: YOLA Gastelum    Given the findings at the ampulla, would recommend referral to Dr. Lea for EUS +/- ERCP.  ----- Message -----  From: Donna Lan APRN  Sent: 5/21/2025   2:57 PM EDT  To: Karen Shrestha MD    Seen as a new patient for elevated liver enzymes and abnormal imaging.  Please review her imaging and see if you think she is a candidate for ERCP.  Thanks

## 2025-05-22 NOTE — PROGRESS NOTES
"Well Woman Visit    CC: Annual well woman exam       HPI:   49 y.o. Contraception or HRT: Post menopausal  Menses:  none   Pain:  None  Incontinence concerns: No  Hx of abnormal pap:  No  Pt has no complaints today. Ten months of sobriety currently.       History: PMHx, Meds, Allergies, PSHx, Social Hx, and POBHx all reviewed and updated.      PHYSICAL EXAM:  /70   Pulse 75   Ht 170.2 cm (67.01\")   Wt 74.4 kg (164 lb)   BMI 25.68 kg/m²   General- NAD, alert and oriented, appropriate  Psych- Normal mood, good memory  Neck- No masses, no thyroid enlargement  CV- Regular rhythm, no murnurs  Resp- CTA to bases, no wheezes  Abdomen- Soft, non distended, non tender, no masses    Breast left-  Bilaterally symmetrical, no masses, non tender, no nipple discharge  Breast right- Bilaterally symmetrical, no masses, non tender, no nipple discharge    External genitalia- Normal female, no lesions  Urethra/meatus- Normal, no masses, non tender, no prolapse  Bladder- Normal, no masses, non tender, no prolapse  Vagina- Normal, no atrophy, no lesions, no discharge, no prolapse  Cvx- Normal, no lesions, no discharge, No cervical motion tenderness  Uterus- Normal size, shape & consistency.  Non tender, mobile.  Adnexa- No mass, non tender  Anus/Rectum/Perineum- Not performed    Lymphatic- No palpable neck, axillary, or groin nodes  Ext- No edema, no cyanosis    Skin- No lesions, no rashes, no acanthosis nigricans        ASSESSMENT and PLAN:  WWE    Diagnoses and all orders for this visit:    1. Encounter for gynecological examination without abnormal finding (Primary)  -     IgP, Aptima HPV        Domestic violence/abuse screen: negative    Depression screen: no SI    Preventative:   BREAST HEALTH- Monthly self breast exam importance and how to reviewed. MMG and/or MRI (prn) reviewed per society guidelines and her individual history. Mammo/MRI screen: Pt will call to schedule.  CERVICAL CANCER Screening- Reviewed " current ASCCP guidelines for screening w and wo cotest HPV, age specific.  Screen: Updated today.  COLON CANCER Screening- Reviewed current medical society guidelines and options.  Colonoscopy screen:  Already up to date.  SEXUAL HEALTH: Declines STD screening.  VACCINATIONS Recommended: Flu annually, Zoster (51yo and older).  Importance discussed, risk being unvaccinated reviewed.  Questions answered  SMOKING STATUS- pt does use nicotine and/or tobacco.  I reviewed importance of avoiding.  Options to quit offered per [employer] protocols.  Recommend FU w PCP regarding quitting options if unable to quit wo assistance.  Follow up PCP/Specialist PMHx and Labs  [facility] : does not qualify      She understands the importance of having any ordered tests to be performed in a timely fashion.  She is encouraged to review her results online and/or contact or office if she has questions.     Follow Up:  Return in about 1 year (around 5/22/2026) for Annual physical.      Ailyn Mcfadden, APRN  05/22/2025

## 2025-05-23 ENCOUNTER — LAB (OUTPATIENT)
Facility: HOSPITAL | Age: 50
End: 2025-05-23
Payer: MEDICAID

## 2025-05-23 LAB
ALBUMIN SERPL-MCNC: 4.4 G/DL (ref 3.5–5.2)
ALBUMIN/GLOB SERPL: 1.3 G/DL
ALP SERPL-CCNC: 121 U/L (ref 39–117)
ALT SERPL W P-5'-P-CCNC: 106 U/L (ref 1–33)
ANION GAP SERPL CALCULATED.3IONS-SCNC: 8.4 MMOL/L (ref 5–15)
AST SERPL-CCNC: 102 U/L (ref 1–32)
BASOPHILS # BLD AUTO: 0.02 10*3/MM3 (ref 0–0.2)
BASOPHILS NFR BLD AUTO: 0.4 % (ref 0–1.5)
BILIRUB SERPL-MCNC: 0.4 MG/DL (ref 0–1.2)
BUN SERPL-MCNC: 10 MG/DL (ref 6–20)
BUN/CREAT SERPL: 11.6 (ref 7–25)
CALCIUM SPEC-SCNC: 9.7 MG/DL (ref 8.6–10.5)
CERULOPLASMIN SERPL-MCNC: 22 MG/DL (ref 19–39)
CHLORIDE SERPL-SCNC: 99 MMOL/L (ref 98–107)
CO2 SERPL-SCNC: 28.6 MMOL/L (ref 22–29)
CREAT SERPL-MCNC: 0.86 MG/DL (ref 0.57–1)
DEPRECATED RDW RBC AUTO: 38.3 FL (ref 37–54)
EGFRCR SERPLBLD CKD-EPI 2021: 81.9 ML/MIN/1.73
EOSINOPHIL # BLD AUTO: 0.16 10*3/MM3 (ref 0–0.4)
EOSINOPHIL NFR BLD AUTO: 3.2 % (ref 0.3–6.2)
ERYTHROCYTE [DISTWIDTH] IN BLOOD BY AUTOMATED COUNT: 11.8 % (ref 12.3–15.4)
FERRITIN SERPL-MCNC: 114 NG/ML (ref 13–150)
GLOBULIN UR ELPH-MCNC: 3.3 GM/DL
GLUCOSE SERPL-MCNC: 69 MG/DL (ref 65–99)
HAV IGM SERPL QL IA: NORMAL
HBV CORE IGM SERPL QL IA: NORMAL
HBV SURFACE AG SERPL QL IA: NORMAL
HCT VFR BLD AUTO: 44.4 % (ref 34–46.6)
HCV AB SER QL: NORMAL
HGB BLD-MCNC: 14.9 G/DL (ref 12–15.9)
IMM GRANULOCYTES # BLD AUTO: 0 10*3/MM3 (ref 0–0.05)
IMM GRANULOCYTES NFR BLD AUTO: 0 % (ref 0–0.5)
IRON 24H UR-MRATE: 142 MCG/DL (ref 37–145)
IRON SATN MFR SERPL: 30 % (ref 20–50)
LIPASE SERPL-CCNC: 36 U/L (ref 13–60)
LYMPHOCYTES # BLD AUTO: 1.86 10*3/MM3 (ref 0.7–3.1)
LYMPHOCYTES NFR BLD AUTO: 37.3 % (ref 19.6–45.3)
MCH RBC QN AUTO: 30 PG (ref 26.6–33)
MCHC RBC AUTO-ENTMCNC: 33.6 G/DL (ref 31.5–35.7)
MCV RBC AUTO: 89.3 FL (ref 79–97)
MONOCYTES # BLD AUTO: 0.27 10*3/MM3 (ref 0.1–0.9)
MONOCYTES NFR BLD AUTO: 5.4 % (ref 5–12)
NEUTROPHILS NFR BLD AUTO: 2.67 10*3/MM3 (ref 1.7–7)
NEUTROPHILS NFR BLD AUTO: 53.7 % (ref 42.7–76)
NRBC BLD AUTO-RTO: 0 /100 WBC (ref 0–0.2)
PLATELET # BLD AUTO: 215 10*3/MM3 (ref 140–450)
PMV BLD AUTO: 11 FL (ref 6–12)
POTASSIUM SERPL-SCNC: 4.2 MMOL/L (ref 3.5–5.2)
PROT SERPL-MCNC: 7.7 G/DL (ref 6–8.5)
RBC # BLD AUTO: 4.97 10*6/MM3 (ref 3.77–5.28)
SODIUM SERPL-SCNC: 136 MMOL/L (ref 136–145)
TIBC SERPL-MCNC: 468 MCG/DL (ref 298–536)
TRANSFERRIN SERPL-MCNC: 314 MG/DL (ref 200–360)
WBC NRBC COR # BLD AUTO: 4.98 10*3/MM3 (ref 3.4–10.8)

## 2025-05-23 PROCEDURE — 36415 COLL VENOUS BLD VENIPUNCTURE: CPT | Performed by: NURSE PRACTITIONER

## 2025-05-23 PROCEDURE — 82784 ASSAY IGA/IGD/IGG/IGM EACH: CPT | Performed by: NURSE PRACTITIONER

## 2025-05-23 PROCEDURE — 80053 COMPREHEN METABOLIC PANEL: CPT | Performed by: NURSE PRACTITIONER

## 2025-05-23 PROCEDURE — 82390 ASSAY OF CERULOPLASMIN: CPT | Performed by: NURSE PRACTITIONER

## 2025-05-23 PROCEDURE — 83690 ASSAY OF LIPASE: CPT | Performed by: NURSE PRACTITIONER

## 2025-05-23 PROCEDURE — 83540 ASSAY OF IRON: CPT | Performed by: NURSE PRACTITIONER

## 2025-05-23 PROCEDURE — 86225 DNA ANTIBODY NATIVE: CPT | Performed by: NURSE PRACTITIONER

## 2025-05-23 PROCEDURE — 86334 IMMUNOFIX E-PHORESIS SERUM: CPT | Performed by: NURSE PRACTITIONER

## 2025-05-23 PROCEDURE — 82728 ASSAY OF FERRITIN: CPT | Performed by: NURSE PRACTITIONER

## 2025-05-23 PROCEDURE — 86038 ANTINUCLEAR ANTIBODIES: CPT | Performed by: NURSE PRACTITIONER

## 2025-05-23 PROCEDURE — 84466 ASSAY OF TRANSFERRIN: CPT | Performed by: NURSE PRACTITIONER

## 2025-05-23 PROCEDURE — 86381 MITOCHONDRIAL ANTIBODY EACH: CPT | Performed by: NURSE PRACTITIONER

## 2025-05-23 PROCEDURE — 86015 ACTIN ANTIBODY EACH: CPT | Performed by: NURSE PRACTITIONER

## 2025-05-23 PROCEDURE — 80074 ACUTE HEPATITIS PANEL: CPT | Performed by: NURSE PRACTITIONER

## 2025-05-23 PROCEDURE — 85025 COMPLETE CBC W/AUTO DIFF WBC: CPT | Performed by: NURSE PRACTITIONER

## 2025-05-24 LAB
MITOCHONDRIA M2 IGG SER-ACNC: <20 UNITS (ref 0–20)
SMA IGG SER-ACNC: 7 UNITS (ref 0–19)

## 2025-05-27 DIAGNOSIS — R93.89 ABNORMAL FINDING ON IMAGING: Primary | ICD-10-CM

## 2025-05-27 LAB
ANA SER QL: POSITIVE
CYTOLOGIST CVX/VAG CYTO: NORMAL
CYTOLOGY CVX/VAG DOC CYTO: NORMAL
CYTOLOGY CVX/VAG DOC THIN PREP: NORMAL
DSDNA AB SER-ACNC: <1 IU/ML (ref 0–9)
DX ICD CODE: NORMAL
HPV I/H RISK 4 DNA CVX QL PROBE+SIG AMP: NEGATIVE
Lab: NORMAL
OTHER STN SPEC: NORMAL
SERVICE CMNT-IMP: NORMAL
STAT OF ADQ CVX/VAG CYTO-IMP: NORMAL

## 2025-05-29 ENCOUNTER — PATIENT ROUNDING (BHMG ONLY) (OUTPATIENT)
Dept: GASTROENTEROLOGY | Facility: CLINIC | Age: 50
End: 2025-05-29
Payer: MEDICAID

## 2025-05-29 LAB
IGA SERPL-MCNC: 316 MG/DL (ref 87–352)
IGG SERPL-MCNC: 967 MG/DL (ref 586–1602)
IGM SERPL-MCNC: 129 MG/DL (ref 26–217)
PROT PATTERN SERPL IFE-IMP: NORMAL

## 2025-05-29 NOTE — PROGRESS NOTES
5/29/2025      Hello, may I speak with Analisa Brennon     My name is Judith. I am calling from [facility]. I show that you had a recent visit with YOLA Beverly.    Before we get started may I verify your date of birth? 1975    I am calling to officially welcome you to our practice and ask about your recent visit. Is this a good time to talk? LVM ok per TARA     Tell me about your visit with us. What things went well?    We strive to ensure that we protect your safety and privacy. Is there anything we could have done to improve this during your visit?        We're always looking for ways to make our patients' experiences even better. Do you have recommendations on ways we may improve?    Overall were you satisfied with your first visit to our practice?    I appreciate you taking the time to speak with me today. Is there anything else I can do for you?    I am glad to hear that you had a very good visit and I appreciate you taking the time to provide feedback on this call. We would greatly appreciate you filling out a survey if you receive one in the mail, email or text. This is a great opportunity to provide any additional feedback that you may think of after this call as well.       Thank you, and have a great day.

## 2025-06-13 RX ORDER — ROSUVASTATIN CALCIUM 5 MG/1
5 TABLET, COATED ORAL DAILY
Qty: 90 TABLET | Refills: 0 | Status: SHIPPED | OUTPATIENT
Start: 2025-06-13

## 2025-06-16 NOTE — PATIENT INSTRUCTIONS
Chronic Obstructive Pulmonary Disease Exacerbation    Chronic obstructive pulmonary disease (COPD) is a long-term (chronic) lung problem.  When you have COPD, it can feel harder to breathe in or out.  COPD exacerbation is a flare-up of symptoms when breathing gets worse and more treatment may be needed. Without treatment, flare-ups can be life-threatening. If they happen often, your lungs can become more damaged.  What are the causes?  Not taking your usual COPD medicines as told by your health care provider.  A cold or the flu, which can cause infection in your lungs.  Being exposed to things that make your breathing worse, such as:  Smoke.  Air pollution.  Fumes.  Dust.  Allergies.  Weather changes.  What are the signs or symptoms?  Symptoms do not get better or get worse even if you take your medicines as told by your provider. Symptoms may include:  More shortness of breath. You may only be able to speak one or two words at a time.  More coughing or mucus from your lungs.  More wheezing or chest tightness.  Being more tired and having less energy.  Confusion.  How is this diagnosed?  This condition is diagnosed based on:  Symptoms that get worse.  Your medical history.  A physical exam.  You may also have tests, including:  A chest X-ray.  Blood or mucus tests.  How is this treated?  You may be able to stay home or you may need to go to the hospital. Treatment may include:  Taking medicines. These may include:  Inhalers. These have medicines in them that you breathe in. These may be more of what you already take or they may be new.  Steroids. These reduce inflammation in the airways. These may be inhaled, taken by mouth, or given in an IV.  Antibiotics. These treat infection.  Using oxygen.  Using a device to help you clear mucus.  Follow these instructions at home:  Medicines  Take your medicines only as told by your provider.  If you were given antibiotics or steroids, take them as told by your provider. Do  not stop taking them even if you start to feel better.  Lifestyle  Several times a day, wash your hands with soap and water for at least 20 seconds.  If you cannot use soap and water, use hand .  This may help keep you from getting an infection.  Avoid being around crowds or people who are sick.  Do not smoke or use any products that contain nicotine or tobacco. If you need help quitting, ask your provider.  Return to your normal activities when your provider says that it's safe.  Use breathing methods to control your stress and catch your breath.  How is this prevented?  Follow your COPD action plan. The action plan tells you what to do if you're feeling good and what to do when you start feeling worse. Discuss the plan often with your provider.  Make sure you get all the shots, also called vaccines, that your provider recommends. Ask your provider about a flu shot and a pneumonia shot.  Use oxygen therapy if told by your provider. If you need home oxygen therapy, ask your provider how often to check your oxygen level with a device called an oximeter.  Keep all follow-up visits to review your COPD action plan. Your provider will want to check on your condition often to keep you healthy and out of the hospital.  Contact a health care provider if:  Your COPD symptoms get worse.  You have a fever or chills.  You have trouble doing daily activities.  You have trouble breathing even when you are resting.  Get help right away if:  You are short of breath and cannot:  Talk in full sentences.  Do normal activities.  You have chest pain.  You feel confused.  These symptoms may be an emergency. Call 911 right away.  Do not wait to see if the symptoms will go away.  Do not drive yourself to the hospital.  This information is not intended to replace advice given to you by your health care provider. Make sure you discuss any questions you have with your health care provider.  Document Revised: 09/19/2024 Document  Reviewed: 03/04/2024  [employer] Patient Education © 2024 [employer]Managing the Challenge of Quitting Smoking  Quitting smoking is a physical and mental challenge. You may have cravings, withdrawal symptoms, and temptation to smoke. Before quitting, work with your health care provider to make a plan that can help you manage quitting. Making a plan before you quit may keep you from smoking when you have the urge to smoke while trying to quit.  How to manage lifestyle changes  Managing stress  Stress can make you want to smoke, and wanting to smoke may cause stress. It is important to find ways to manage your stress. You could try some of the following:  Practice relaxation techniques.  Breathe slowly and deeply, in through your nose and out through your mouth.  Listen to music.  Soak in a bath or take a shower.  Imagine a peaceful place or vacation.  Get some support.  Talk with family or friends about your stress.  Join a support group.  Talk with a counselor or therapist.  Get some physical activity.  Go for a walk, run, or bike ride.  Play a favorite sport.  Practice yoga.    Medicines  Talk with your health care provider about medicines that might help you deal with cravings and make quitting easier for you.  Relationships  Social situations can be difficult when you are quitting smoking. To manage this, you can:  Avoid parties and other social situations where people might be smoking.  Avoid alcohol.  Leave right away if you have the urge to smoke.  Explain to your family and friends that you are quitting smoking. Ask for support and let them know you might be a bit grumpy.  Plan activities where smoking is not an option.  General instructions  Be aware that many people gain weight after they quit smoking. However, not everyone does. To keep from gaining weight, have a plan in place before you quit, and stick to the plan after you quit. Your plan should include:  Eating healthy snacks. When you have a craving,  it may help to:  Eat popcorn, or try carrots, celery, or other cut vegetables.  Chew sugar-free gum.  Changing how you eat.  Eat small portion sizes at meals.  Eat 4-6 small meals throughout the day instead of 1-2 large meals a day.  Be mindful when you eat. You should avoid watching television or doing other things that might distract you as you eat.  Exercising regularly.  Make time to exercise each day. If you do not have time for a long workout, do short bouts of exercise for 5-10 minutes several times a day.  Do some form of strengthening exercise, such as weight lifting.  Do some exercise that gets your heart beating and causes you to breathe deeply, such as walking fast, running, swimming, or biking. This is very important.  Drinking plenty of water or other low-calorie or no-calorie drinks. Drink enough fluid to keep your urine pale yellow.    How to recognize withdrawal symptoms  Your body and mind may experience discomfort as you try to get used to not having nicotine in your system. These effects are called withdrawal symptoms. They may include:  Feeling hungrier than normal.  Having trouble concentrating.  Feeling irritable or restless.  Having trouble sleeping.  Feeling depressed.  Craving a cigarette.  These symptoms may surprise you, but they are normal to have when quitting smoking.  To manage withdrawal symptoms:  Avoid places, people, and activities that trigger your cravings.  Remember why you want to quit.  Get plenty of sleep.  Avoid coffee and other drinks that contain caffeine. These may worsen some of your symptoms.  How to manage cravings  Come up with a plan for how to deal with your cravings. The plan should include the following:  A definition of the specific situation you want to deal with.  An activity or action you will take to replace smoking.  A clear idea for how this action will help.  The name of someone who could help you with this.  Cravings usually last for 5-10 minutes.  Consider taking the following actions to help you with your plan to deal with cravings:  Keep your mouth busy.  Chew sugar-free gum.  Suck on hard candies or a straw.  Brush your teeth.  Keep your hands and body busy.  Change to a different activity right away.  Squeeze or play with a ball.  Do an activity or a hobby, such as making bead jewelry, practicing needlepoint, or working with wood.  Mix up your normal routine.  Take a short exercise break. Go for a quick walk, or run up and down stairs.  Focus on doing something kind or helpful for someone else.  Call a friend or family member to talk during a craving.  Join a support group.  Contact a quitline.  Where to find support  To get help or find a support group:  Call the [employer] Smoking Quitline: 9-800-QUIT-NOW (515-2310)  Text QUIT to SmokefreeTXT: 155693  Where to find more information  Visit these websites to find more information on quitting smoking:  [place] Department of Health and Human Services: www.smokefree.gov  American Lung Association: www.freedomfromsmoking.org  Centers for Disease Control and Prevention (CDC): www.cdc.gov  American Heart Association: www.heart.org  Contact a health care provider if:  You want to change your plan for quitting.  The medicines you are taking are not helping.  Your eating feels out of control or you cannot sleep.  You feel depressed or become very anxious.  Summary  Quitting smoking is a physical and mental challenge. You will face cravings, withdrawal symptoms, and temptation to smoke again. Preparation can help you as you go through these challenges.  Try different techniques to manage stress, handle social situations, and prevent weight gain.  You can deal with cravings by keeping your mouth busy (such as by chewing gum), keeping your hands and body busy, calling family or friends, or contacting a quitline for people who want to quit smoking.  You can deal with withdrawal symptoms by avoiding  places where people smoke, getting plenty of rest, and avoiding drinks that contain caffeine.  This information is not intended to replace advice given to you by your health care provider. Make sure you discuss any questions you have with your health care provider.  Document Revised: 12/09/2022 Document Reviewed: 12/09/2022  [employer] Patient Education © 2024 [employer]

## 2025-06-16 NOTE — PROGRESS NOTES
Primary Care Provider  Prerna Brunner APRN     Referring Provider  No ref. provider found     Chief Complaint  Follow-up (3 month), COPD, Asthma, and Shortness of Breath (Has gotten a little worse)    Subjective          History of Presenting Illness  Patient is a 49-year-old female, patient of Dr. Acosta who presents for management of a lung nodule who presents for a follow-up visit today.     Patient had a low-dose chest CT scan completed on 3/17/2025.  Report states stable noncalcified pulmonary nodules measuring up to 7 mm.  Moderate emphysema.    Patient states that she does get short of breath that is worse with exertion, moderate in severity, and improved with rest.  Patient states that she is taking Symbicort and Spiriva every day as prescribed and uses albuterol inhaler and DuoNeb nebulizer treatments as needed.  Patient is needing DMV form completed in the office today to receive a handicap permit as she is not able to ambulate long distance.  Patient states that she is still smoking is currently taking Chantix to help her to quit.  Patient states that she is not able to start pulmonary rehab at this as she is in a rehab at this time and has to attend classes 5 days a week.    Patient denies fever, chills, night sweats, swollen glands in the head and neck, unintentional weight loss, hemoptysis, purulent sputum production, dysphagia, chest pain, palpitations, chest tightness, abdominal pain, nausea, vomiting, and diarrhea. Patient denies any leg swelling, orthopnea, paroxysmal nocturnal dyspnea.  Patient is able to perform activities of daily living.        Review of Systems     Family History   Problem Relation Age of Onset    Heart disease Father     Diabetes Father     Heart failure Father     Heart disease Mother     Diabetes Mother     Heart failure Mother     Diabetes Sister     Breast cancer Neg Hx     Ovarian cancer Neg Hx     Uterine cancer Neg Hx     Colon cancer Neg Hx     Melanoma Neg Hx      Prostate cancer Neg Hx     Deep vein thrombosis Neg Hx         Social History     Socioeconomic History    Marital status:    Tobacco Use    Smoking status: Every Day     Current packs/day: 0.50     Average packs/day: 1 pack/day for 38.5 years (38.4 ttl pk-yrs)     Types: Cigarettes     Start date: 1/1/1987     Passive exposure: Current    Smokeless tobacco: Never    Tobacco comments:     Currently smoking half pack daily. 03--js   Vaping Use    Vaping status: Never Used   Substance and Sexual Activity    Alcohol use: Not Currently    Drug use: Not Currently     Types: Methamphetamines, Heroin    Sexual activity: Defer     Birth control/protection: Post-menopausal        Past Medical History:   Diagnosis Date    Anxiety     Asthma 2019    CHF (congestive heart failure) 2019    COPD (chronic obstructive pulmonary disease)     Coronary artery disease 2019    Heart valve disease 2024    Hyperlipidemia 2024    Shortness of breath     Substance abuse         Immunization History   Administered Date(s) Administered    Fluzone  >6mos 10/10/2024    Pneumococcal Conjugate 20-Valent (PCV20) 12/18/2024    Shingrix 11/19/2024, 01/20/2025    Tdap 10/10/2024       No Known Allergies       Current Outpatient Medications:     budesonide-formoterol (SYMBICORT) 160-4.5 MCG/ACT inhaler, Inhale 2 puffs 2 (Two) Times a Day., Disp: 1 each, Rfl: 5    buprenorphine-naloxone (SUBOXONE) 8-2 MG per SL tablet, Place 1 tablet under the tongue Daily., Disp: , Rfl:     furosemide (LASIX) 40 MG tablet, Take 1 tablet by mouth Daily., Disp: 90 tablet, Rfl: 3    hydrOXYzine pamoate (VISTARIL) 25 MG capsule, Take 1 capsule by mouth 3 (Three) Times a Day As Needed for Anxiety., Disp: 90 capsule, Rfl: 3    ibuprofen (ADVIL,MOTRIN) 400 MG tablet, Take 1 tablet by mouth 3 times a day., Disp: 90 tablet, Rfl: 1    [facility] COVID-19 Rapid Test kit, FOLLOW DIRECTIONS INSIDE PACKAGE TO TEST FOR COVID-19, Disp: , Rfl:     ipratropium-albuterol  (DUO-NEB) 0.5-2.5 mg/3 ml nebulizer, Take 3 mL by nebulization Every 4 (Four) Hours As Needed for Wheezing., Disp: 360 mL, Rfl: 5    lidocaine (LIDODERM) 5 %, Place 1 patch on the skin as directed by provider Daily., Disp: 30 each, Rfl: 2    losartan (COZAAR) 25 MG tablet, Take 1 tablet by mouth Daily., Disp: 60 tablet, Rfl: 5    One Daily Womens tablet tablet, Take 1 tablet by mouth Daily., Disp: , Rfl:     PARoxetine (PAXIL) 20 MG tablet, Take 1 tablet by mouth Daily., Disp: , Rfl:     prazosin (MINIPRESS) 1 MG capsule, Take 1 capsule by mouth every night at bedtime., Disp: , Rfl:     QUEtiapine (SEROquel) 100 MG tablet, Take 1 tablet by mouth every night at bedtime., Disp: , Rfl:     rosuvastatin (CRESTOR) 5 MG tablet, TAKE ONE TABLET BY MOUTH EVERY DAY, Disp: 90 tablet, Rfl: 0    spironolactone (ALDACTONE) 25 MG tablet, Take 1 tablet by mouth Daily., Disp: 90 tablet, Rfl: 3    tiotropium (Spiriva HandiHaler) 18 MCG per inhalation capsule, Place 1 capsule into inhaler and inhale Daily for 30 days., Disp: 30 capsule, Rfl: 5    tretinoin (Retin-A) 0.025 % cream, Apply 1 Application topically to the appropriate area as directed Every Night., Disp: 45 g, Rfl: 0    Varenicline Tartrate, Starter, 0.5 MG X 11 & 1 MG X 42 tablet therapy pack, take per package instructions, Disp: , Rfl:     Ventolin  (90 Base) MCG/ACT inhaler, Inhale 2 puffs Every 4 (Four) Hours As Needed for Shortness of Air., Disp: 18 g, Rfl: 5     Objective     Physical Exam  Vital Signs:   WDWN, Alert, NAD.    HEENT:  PERRL, EOMI.  OP, nares clear, no sinus tenderness  Neck:  Supple, no JVD, no thyromegaly.  Lymph: no axillary, cervical, supraclavicular lymphadenopathy noted bilaterally  Chest:  Mildly decreased breath sounds throughout with some expiratory wheezes. Normal work of breathing noted. Patient is able speak full sentences without difficulty.   CV: RRR, no MGR, pulses 2+, equal.  Abd:  Soft, NT, ND, + BS, no HSM  EXT:  no clubbing,  "no cyanosis, no edema, no joint tenderness  Neuro:  A&Ox3, CN grossly intact, no focal deficits.  Skin: No rashes or lesions noted.    /69 (BP Location: Left arm, Patient Position: Sitting, Cuff Size: Adult)   Pulse 68   Temp 97.4 °F (36.3 °C) (Temporal)   Resp 16   Ht 170.2 cm (67\")   Wt 73 kg (161 lb)   SpO2 92% Comment: room air  BMI 25.22 kg/m²         Result Review :   I have reviewed my last office visit note.  I also reviewed low-dose chest CT scan report dated from 3/17/2025.  See scanned report.    Procedures:              Assessment and Plan      Assessment:  1. 7 mm right upper lobe lung nodule.  Stable on low dose chest CT scan dated from 3/17/2025. Will need close follow-up via Fleischner criteria in this high risk patient.  2. 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 FEV1 of 35% predicted on PFTs completed on 1/9/2025. Alpha 1 antitrypsin with a normal genotype of MM with a level of 171.  3. Chronic dyspnea   4. Chronic cough  5. Peripheral eosinophilia.  6. Tobacco abuse of cigarettes ongoing.  Patient is enrolled in lung cancer screening by her primary care provider.        Plan:  1.  Discussed with patient that I could switch her over to straight nebulizer treatments, however patient prefers to stay on inhalers at this time.  Continue Symbicort and Spiriva as prescribed and rinse mouth out after each use.  2.  Continue albuterol inhaler and DuoNeb nebulizer treatments as needed.  3.  DMV form for handicap permit completed and given to the patient in the office today.  4.   Patient states that she is not able to start pulmonary rehab at this as she is in a rehab at this time and has to attend classes 5 days a week.  5.  Vaccination status:  patient reports they are up-to-date with flu and pneumonia vaccines.  Patient declines COVID-19 vaccination.    6.  Smoking status: patient is a current cigarette smoker.  I counseled the patient on smoking cessation.  I " counseled the patient on the risks of continued smoking including the risk of lung cancer, head and neck cancer, renal cancer, heart disease, stroke, and early death.  Patient states that she is currently taking Chantix to help her to quit smoking.  Patient is advised to decrease the number of cigarettes they are smoking up until the point to where they can quit.  7.  Patient to call the office, 911, or go to the ER with new or worsening symptoms.  8.  Note given to patient in the office today to have off school until follow-up appointment.  9.  Follow-up in 3 months, sooner if needed.          Follow Up   Return in about 3 months (around 9/24/2025).  Patient was given instructions and counseling regarding her condition or for health maintenance advice. Please see specific information pulled into the AVS if appropriate.

## 2025-06-20 ENCOUNTER — OFFICE VISIT (OUTPATIENT)
Dept: FAMILY MEDICINE CLINIC | Facility: CLINIC | Age: 50
End: 2025-06-20
Payer: MEDICAID

## 2025-06-20 VITALS
WEIGHT: 160 LBS | SYSTOLIC BLOOD PRESSURE: 128 MMHG | OXYGEN SATURATION: 94 % | DIASTOLIC BLOOD PRESSURE: 78 MMHG | HEIGHT: 67 IN | BODY MASS INDEX: 25.11 KG/M2 | HEART RATE: 72 BPM

## 2025-06-20 DIAGNOSIS — L70.0 ACNE VULGARIS: ICD-10-CM

## 2025-06-20 DIAGNOSIS — I10 PRIMARY HYPERTENSION: ICD-10-CM

## 2025-06-20 DIAGNOSIS — L98.8 AGE-RELATED FACIAL WRINKLES: ICD-10-CM

## 2025-06-20 DIAGNOSIS — G89.29 CHRONIC MIDLINE THORACIC BACK PAIN: Primary | ICD-10-CM

## 2025-06-20 DIAGNOSIS — M54.6 CHRONIC MIDLINE THORACIC BACK PAIN: Primary | ICD-10-CM

## 2025-06-20 PROCEDURE — 1160F RVW MEDS BY RX/DR IN RCRD: CPT

## 2025-06-20 PROCEDURE — 99214 OFFICE O/P EST MOD 30 MIN: CPT

## 2025-06-20 PROCEDURE — 1159F MED LIST DOCD IN RCRD: CPT

## 2025-06-20 RX ORDER — IBUPROFEN 400 MG/1
400 TABLET, FILM COATED ORAL 3 TIMES DAILY
Status: CANCELLED | OUTPATIENT
Start: 2025-06-20

## 2025-06-20 RX ORDER — TRETINOIN 0.25 MG/G
1 CREAM TOPICAL NIGHTLY
Qty: 45 G | Refills: 0 | Status: SHIPPED | OUTPATIENT
Start: 2025-06-20

## 2025-06-20 RX ORDER — IBUPROFEN 400 MG/1
400 TABLET, FILM COATED ORAL 3 TIMES DAILY
Qty: 90 TABLET | Refills: 1 | Status: SHIPPED | OUTPATIENT
Start: 2025-06-20

## 2025-06-20 NOTE — PROGRESS NOTES
Chief Complaint  Hypertension and COPD    PEEWEE Willett presents to [facility]    History of Present Illness  Patient is a 49-year-old female who presents today for 6 month follow up on chronic conditions. Still at [facility] for sober living, psych there is managing her psych meds and suboxone.      She is seeing pulmonology for management of her COPD. Current inhalers have improved her SOA. She is down to 8 cigarettes a day.     She established with cariology, echo showed normal heart function. BP and HR normal, continues to be managed for HTN, HLD on losartan 25 mg and Crestor 5 mg.    She still has occasional mid back pain, worse with prolonged activity. Requesting refill of ibuprofen 400 mg.     She is seeing GI for management of her elevated  liver enzymes and pancreatic bile duct dilation. She is being sent to [facility] for ERCP.     She has yet to schedule her mammogram, given number to call and schedule.     She requests RX fro Retin-A for acne, scarring and wrinkles. States friend is on it and it works well.     Labs UTD by specialist.     No other concerns.         Past Medical History:   Diagnosis Date    Anxiety     Asthma 2019    CHF (congestive heart failure) 2019    COPD (chronic obstructive pulmonary disease)     Coronary artery disease 2019    Heart valve disease 2024    Hyperlipidemia 2024    Shortness of breath     Substance abuse       Family History   Problem Relation Age of Onset    Heart disease Father     Diabetes Father     Heart failure Father     Heart disease Mother     Diabetes Mother     Heart failure Mother     Diabetes Sister     Breast cancer Neg Hx     Ovarian cancer Neg Hx     Uterine cancer Neg Hx     Colon cancer Neg Hx     Melanoma Neg Hx     Prostate cancer Neg Hx     Deep vein thrombosis Neg Hx       Past Surgical History:   Procedure Laterality Date    CHOLECYSTECTOMY          Current Outpatient Medications:     budesonide-formoterol  (SYMBICORT) 160-4.5 MCG/ACT inhaler, Inhale 2 puffs 2 (Two) Times a Day., Disp: 1 each, Rfl: 5    buprenorphine-naloxone (SUBOXONE) 8-2 MG per SL tablet, Place 1 tablet under the tongue Daily., Disp: , Rfl:     furosemide (LASIX) 40 MG tablet, Take 1 tablet by mouth Daily., Disp: 90 tablet, Rfl: 3    hydrOXYzine pamoate (VISTARIL) 25 MG capsule, Take 1 capsule by mouth 3 (Three) Times a Day As Needed for Anxiety., Disp: 90 capsule, Rfl: 3    ibuprofen (ADVIL,MOTRIN) 400 MG tablet, Take 1 tablet by mouth 3 times a day., Disp: 90 tablet, Rfl: 1    ipratropium-albuterol (DUO-NEB) 0.5-2.5 mg/3 ml nebulizer, Take 3 mL by nebulization Every 4 (Four) Hours As Needed for Wheezing., Disp: 360 mL, Rfl: 5    lidocaine (LIDODERM) 5 %, Place 1 patch on the skin as directed by provider Daily., Disp: 30 each, Rfl: 2    losartan (COZAAR) 25 MG tablet, Take 1 tablet by mouth Daily., Disp: 60 tablet, Rfl: 5    One Daily Womens tablet tablet, Take 1 tablet by mouth Daily., Disp: , Rfl:     PARoxetine (PAXIL) 20 MG tablet, Take 1 tablet by mouth Daily., Disp: , Rfl:     prazosin (MINIPRESS) 1 MG capsule, Take 1 capsule by mouth every night at bedtime., Disp: , Rfl:     QUEtiapine (SEROquel) 100 MG tablet, Take 1 tablet by mouth every night at bedtime., Disp: , Rfl:     rosuvastatin (CRESTOR) 5 MG tablet, TAKE ONE TABLET BY MOUTH EVERY DAY, Disp: 90 tablet, Rfl: 0    spironolactone (ALDACTONE) 25 MG tablet, Take 1 tablet by mouth Daily., Disp: 90 tablet, Rfl: 3    Varenicline Tartrate, Starter, 0.5 MG X 11 & 1 MG X 42 tablet therapy pack, take per package instructions, Disp: , Rfl:     Ventolin  (90 Base) MCG/ACT inhaler, Inhale 2 puffs Every 4 (Four) Hours As Needed for Shortness of Air., Disp: 18 g, Rfl: 5    tiotropium (Spiriva HandiHaler) 18 MCG per inhalation capsule, Place 1 capsule into inhaler and inhale Daily for 30 days., Disp: 30 capsule, Rfl: 5    tretinoin (Retin-A) 0.025 % cream, Apply 1 Application topically to the  "appropriate area as directed Every Night., Disp: 45 g, Rfl: 0    OBJECTIVE  Vital Signs:   /78 (BP Location: Right arm, Patient Position: Sitting, Cuff Size: Adult)   Pulse 72   Ht 170.2 cm (67.01\")   Wt 72.6 kg (160 lb)   SpO2 94%   BMI 25.05 kg/m²    Estimated body mass index is 25.05 kg/m² as calculated from the following:    Height as of this encounter: 170.2 cm (67.01\").    Weight as of this encounter: 72.6 kg (160 lb).     Wt Readings from Last 3 Encounters:   06/20/25 72.6 kg (160 lb)   05/22/25 74.4 kg (164 lb)   05/21/25 75.8 kg (167 lb)     BP Readings from Last 3 Encounters:   06/20/25 128/78   05/22/25 124/70   04/28/25 140/82       Physical Exam  Vitals reviewed.   Constitutional:       General: She is not in acute distress.     Appearance: She is not ill-appearing.   HENT:      Head: Normocephalic and atraumatic.   Eyes:      Conjunctiva/sclera: Conjunctivae normal.   Cardiovascular:      Rate and Rhythm: Normal rate and regular rhythm.      Heart sounds: Normal heart sounds.   Pulmonary:      Effort: Pulmonary effort is normal.      Breath sounds: Normal breath sounds.   Musculoskeletal:      Cervical back: Normal range of motion.   Skin:     Comments: Face- hyperpigmentation and acne scarring, no active pustules currently, fine lines/wrinkles to forehead, eyes, mouth.    Neurological:      Mental Status: She is alert and oriented to person, place, and time.   Psychiatric:         Mood and Affect: Mood normal.         Behavior: Behavior normal.         Thought Content: Thought content normal.         Judgment: Judgment normal.          Result Review    Common labs          12/6/2024    09:29 1/28/2025    15:57 5/23/2025    13:26   Common Labs   Glucose 92  91  69    BUN 18  8  10    Creatinine 0.92  0.76  0.86    Sodium 138  137  136    Potassium 4.1  3.6  4.2    Chloride 100  99  99    Calcium 9.2  8.9  9.7    Albumin 3.5  3.8  4.4    Total Bilirubin 0.4  0.5  0.4    Alkaline Phosphatase " 94  168  121    AST (SGOT) 33  124  102    ALT (SGPT) 64  107  106    WBC 5.14  8.97  4.98    Hemoglobin 13.7  13.7  14.9    Hematocrit 41.5  41.6  44.4    Platelets 191  265  215    Total Cholesterol 226      Triglycerides 62      HDL Cholesterol 57      LDL Cholesterol  158      Hemoglobin A1C 5.40          MRI abdomen w wo contrast mrcp  Result Date: 5/15/2025  Impression: 1. Moderately dilated and tortuous appearance of the biliary tree without visualized mass or filling defect to suggest choledocholithiasis. Slightly blunted appearance at the ampulla. Differential includes biliary sphincter of Oddi dysfunction, ampullary  stricture, versus possible occult ampullary lesion. Consider ERCP based on clinical factors. 2. No acute MRI findings. Other ancillary findings as above. Electronically Signed: Charan Contreras MD  5/15/2025 11:53 AM EDT  Workstation ID: [id number]    CT Abdomen With & Without Contrast  Result Date: 4/7/2025  Impression: 1.Previous cholecystectomy. 2.Intra and extrahepatic biliary dilation. Extrahepatic common bile duct measures up to about 1.2 cm. The biliary dilation is greater than expected for postcholecystectomy patient. There appears to be a cystic structure adjacent and contiguous with the distal common bile duct. Differential may include bile duct diverticulum or choledochocele. Cystic lesion arising from the pancreas or pancreatic duct in the differential. Duodenal diverticulum less likely. Recommend follow-up MRI abdomen with contrast and MRCP to further evaluate. 3.Moderate atherosclerosis in the abdominal aorta. Electronically Signed: Charan Hazel MD  4/7/2025 3:41 PM EDT  Workstation ID: [id number]    CT Chest Low Dose Follow Up Without Contrast  Result Date: 3/20/2025  Impression: 1. Stable noncalcified pulmonary nodules measuring up to 7 mm. 2. Intrahepatic and extrahepatic biliary ductal dilatation. Recommend a follow-up hepatic protocol CT scan of the abdomen and pelvis if the  patient's liver enzymes are abnormal. 3. Moderate emphysema. Coronary calcification. Lung RADS 2S Electronically Signed: Victor Manuel Nolasco MD  3/20/2025 4:04 PM EDT  Workstation ID: [id number]        The above data has been reviewed by YOLA Carter 06/20/2025 14:36 EDT.          Patient Care Team:  Prerna Brunner APRN as PCP - General (Nurse Practitioner)  Donna Lan APRN as Nurse Practitioner (Nurse Practitioner)           ASSESSMENT & PLAN    Diagnoses and all orders for this visit:    1. Chronic midline thoracic back pain (Primary)  Comments:  refilled ibuprofen  Orders:  -     ibuprofen (ADVIL,MOTRIN) 400 MG tablet; Take 1 tablet by mouth 3 times a day.  Dispense: 90 tablet; Refill: 1    2. Acne vulgaris  -     tretinoin (Retin-A) 0.025 % cream; Apply 1 Application topically to the appropriate area as directed Every Night.  Dispense: 45 g; Refill: 0    3. Age-related facial wrinkles  -     tretinoin (Retin-A) 0.025 % cream; Apply 1 Application topically to the appropriate area as directed Every Night.  Dispense: 45 g; Refill: 0    4. Primary hypertension  Comments:  stable in office,continue current treatment regimen and follow-up with cardiology for management         Tobacco Use: High Risk (6/20/2025)    Patient History     Smoking Tobacco Use: Every Day     Smokeless Tobacco Use: Never     Passive Exposure: Current       Follow Up     Return in about 6 months (around 12/20/2025) for Annual physical.      Patient was given instructions and counseling regarding her condition or for health maintenance advice. Please see specific information pulled into the AVS if appropriate.   I have reviewed information obtained and documented by others and I have confirmed the accuracy of this documented note.    YOLA Carter

## 2025-06-24 ENCOUNTER — OFFICE VISIT (OUTPATIENT)
Dept: PULMONOLOGY | Facility: CLINIC | Age: 50
End: 2025-06-24
Payer: MEDICAID

## 2025-06-24 VITALS
OXYGEN SATURATION: 92 % | BODY MASS INDEX: 25.27 KG/M2 | SYSTOLIC BLOOD PRESSURE: 113 MMHG | HEART RATE: 68 BPM | DIASTOLIC BLOOD PRESSURE: 69 MMHG | TEMPERATURE: 97.4 F | RESPIRATION RATE: 16 BRPM | HEIGHT: 67 IN | WEIGHT: 161 LBS

## 2025-06-24 DIAGNOSIS — R05.3 CHRONIC COUGH: ICD-10-CM

## 2025-06-24 DIAGNOSIS — J11.1 INFLUENZA: ICD-10-CM

## 2025-06-24 DIAGNOSIS — J44.89 ASTHMA-COPD OVERLAP SYNDROME: ICD-10-CM

## 2025-06-24 DIAGNOSIS — D72.19 PERIPHERAL EOSINOPHILIA: ICD-10-CM

## 2025-06-24 DIAGNOSIS — R91.1 SOLITARY LUNG NODULE: ICD-10-CM

## 2025-06-24 DIAGNOSIS — Z72.0 TOBACCO ABUSE: Primary | ICD-10-CM

## 2025-06-24 DIAGNOSIS — R06.09 CHRONIC DYSPNEA: ICD-10-CM

## 2025-06-24 PROCEDURE — 99214 OFFICE O/P EST MOD 30 MIN: CPT | Performed by: NURSE PRACTITIONER

## 2025-06-24 PROCEDURE — 1160F RVW MEDS BY RX/DR IN RCRD: CPT | Performed by: NURSE PRACTITIONER

## 2025-06-24 PROCEDURE — 1159F MED LIST DOCD IN RCRD: CPT | Performed by: NURSE PRACTITIONER

## 2025-06-24 RX ORDER — ALBUTEROL SULFATE 90 UG/1
2 AEROSOL, METERED RESPIRATORY (INHALATION) EVERY 4 HOURS PRN
Qty: 18 G | Refills: 5 | Status: SHIPPED | OUTPATIENT
Start: 2025-06-24

## 2025-06-24 RX ORDER — TIOTROPIUM BROMIDE 18 UG/1
1 CAPSULE ORAL; RESPIRATORY (INHALATION)
Qty: 30 CAPSULE | Refills: 5 | Status: SHIPPED | OUTPATIENT
Start: 2025-06-24 | End: 2025-07-24

## 2025-06-24 RX ORDER — BUDESONIDE AND FORMOTEROL FUMARATE DIHYDRATE 160; 4.5 UG/1; UG/1
2 AEROSOL RESPIRATORY (INHALATION) 2 TIMES DAILY
Qty: 1 EACH | Refills: 5 | Status: SHIPPED | OUTPATIENT
Start: 2025-06-24

## 2025-06-24 RX ORDER — BLOOD SUGAR DIAGNOSTIC
STRIP MISCELLANEOUS
COMMUNITY
Start: 2025-06-13

## 2025-06-24 RX ORDER — IPRATROPIUM BROMIDE AND ALBUTEROL SULFATE 2.5; .5 MG/3ML; MG/3ML
3 SOLUTION RESPIRATORY (INHALATION) EVERY 4 HOURS PRN
Qty: 360 ML | Refills: 5 | Status: SHIPPED | OUTPATIENT
Start: 2025-06-24

## 2025-07-10 RX ORDER — HYDROXYZINE PAMOATE 25 MG/1
25 CAPSULE ORAL 3 TIMES DAILY PRN
Qty: 90 CAPSULE | Refills: 3 | Status: SHIPPED | OUTPATIENT
Start: 2025-07-10

## 2025-08-08 DIAGNOSIS — R91.1 SOLITARY LUNG NODULE: ICD-10-CM

## 2025-08-08 RX ORDER — BUDESONIDE AND FORMOTEROL FUMARATE DIHYDRATE 160; 4.5 UG/1; UG/1
2 AEROSOL RESPIRATORY (INHALATION) 2 TIMES DAILY
Qty: 1 EACH | Refills: 5 | Status: SHIPPED | OUTPATIENT
Start: 2025-08-08

## 2025-08-23 ENCOUNTER — HOSPITAL ENCOUNTER (INPATIENT)
Facility: HOSPITAL | Age: 50
LOS: 3 days | Discharge: NURSING, REHAB OR LONG-TERM CARE | End: 2025-08-26
Attending: PHARMACIST | Admitting: INTERNAL MEDICINE
Payer: MEDICAID

## 2025-08-23 ENCOUNTER — APPOINTMENT (OUTPATIENT)
Dept: CT IMAGING | Facility: HOSPITAL | Age: 50
End: 2025-08-23
Payer: MEDICAID

## 2025-08-23 ENCOUNTER — APPOINTMENT (OUTPATIENT)
Dept: GENERAL RADIOLOGY | Facility: HOSPITAL | Age: 50
End: 2025-08-23
Payer: MEDICAID

## 2025-08-23 DIAGNOSIS — J96.01 ACUTE RESPIRATORY FAILURE WITH HYPOXIA: ICD-10-CM

## 2025-08-23 DIAGNOSIS — Z78.9 DECREASED ACTIVITIES OF DAILY LIVING (ADL): ICD-10-CM

## 2025-08-23 DIAGNOSIS — J44.1 COPD EXACERBATION: ICD-10-CM

## 2025-08-23 DIAGNOSIS — R26.2 DIFFICULTY WALKING: ICD-10-CM

## 2025-08-23 DIAGNOSIS — J18.9 PNEUMONIA OF BOTH LOWER LOBES DUE TO INFECTIOUS ORGANISM: Primary | ICD-10-CM

## 2025-08-23 DIAGNOSIS — I21.A1 TYPE 2 MYOCARDIAL INFARCTION: ICD-10-CM

## 2025-08-23 LAB
ALBUMIN SERPL-MCNC: 3.8 G/DL (ref 3.5–5.2)
ALBUMIN/GLOB SERPL: 1.1 G/DL
ALP SERPL-CCNC: 115 U/L (ref 39–117)
ALT SERPL W P-5'-P-CCNC: 29 U/L (ref 1–33)
ANION GAP SERPL CALCULATED.3IONS-SCNC: 15.7 MMOL/L (ref 5–15)
APTT PPP: 28.1 SECONDS (ref 78–95.9)
APTT PPP: 83.8 SECONDS (ref 78–95.9)
ARTERIAL PATENCY WRIST A: POSITIVE
AST SERPL-CCNC: 16 U/L (ref 1–32)
ATMOSPHERIC PRESS: 744.4 MMHG
BASE EXCESS BLDA CALC-SCNC: 1.3 MMOL/L (ref -2–2)
BASOPHILS # BLD AUTO: 0.06 10*3/MM3 (ref 0–0.2)
BASOPHILS NFR BLD AUTO: 0.6 % (ref 0–1.5)
BDY SITE: ABNORMAL
BILIRUB SERPL-MCNC: 0.3 MG/DL (ref 0–1.2)
BUN SERPL-MCNC: 8.6 MG/DL (ref 6–20)
BUN/CREAT SERPL: 9.3 (ref 7–25)
CALCIUM SPEC-SCNC: 9.5 MG/DL (ref 8.6–10.5)
CHLORIDE SERPL-SCNC: 101 MMOL/L (ref 98–107)
CO2 SERPL-SCNC: 23.3 MMOL/L (ref 22–29)
CREAT SERPL-MCNC: 0.92 MG/DL (ref 0.57–1)
D-LACTATE SERPL-SCNC: 1.3 MMOL/L (ref 0.5–2)
D-LACTATE SERPL-SCNC: 3.9 MMOL/L (ref 0.5–2)
DEPRECATED RDW RBC AUTO: 43 FL (ref 37–54)
EGFRCR SERPLBLD CKD-EPI 2021: 75.5 ML/MIN/1.73
EOSINOPHIL # BLD AUTO: 0.1 10*3/MM3 (ref 0–0.4)
EOSINOPHIL NFR BLD AUTO: 0.9 % (ref 0.3–6.2)
ERYTHROCYTE [DISTWIDTH] IN BLOOD BY AUTOMATED COUNT: 12.2 % (ref 12.3–15.4)
FLUAV RNA RESP QL NAA+PROBE: NOT DETECTED
FLUBV RNA NPH QL NAA+NON-PROBE: NOT DETECTED
GEN 5 1HR TROPONIN T REFLEX: 219 NG/L
GLOBULIN UR ELPH-MCNC: 3.6 GM/DL
GLUCOSE BLDC GLUCOMTR-MCNC: 126 MG/DL (ref 70–99)
GLUCOSE BLDC GLUCOMTR-MCNC: 132 MG/DL (ref 70–99)
GLUCOSE BLDC GLUCOMTR-MCNC: 134 MG/DL (ref 70–99)
GLUCOSE SERPL-MCNC: 150 MG/DL (ref 65–99)
HCO3 BLDA-SCNC: 28.2 MMOL/L (ref 22–26)
HCT VFR BLD AUTO: 41 % (ref 34–46.6)
HCT VFR BLD CALC: 39 % (ref 38–51)
HEMODILUTION: NO
HGB BLD-MCNC: 13.3 G/DL (ref 12–15.9)
HGB BLDA-MCNC: 13.3 G/DL (ref 12–18)
HOLD SPECIMEN: NORMAL
IMM GRANULOCYTES # BLD AUTO: 0.14 10*3/MM3 (ref 0–0.05)
IMM GRANULOCYTES NFR BLD AUTO: 1.3 % (ref 0–0.5)
INHALED O2 CONCENTRATION: 47 %
INR PPP: 1.13 (ref 0.86–1.15)
L PNEUMO1 AG UR QL IA: NEGATIVE
LYMPHOCYTES # BLD AUTO: 3.16 10*3/MM3 (ref 0.7–3.1)
LYMPHOCYTES NFR BLD AUTO: 29.5 % (ref 19.6–45.3)
M PNEUMO IGM SER QL: NEGATIVE
MAGNESIUM SERPL-MCNC: 1.9 MG/DL (ref 1.6–2.6)
MCH RBC QN AUTO: 30.9 PG (ref 26.6–33)
MCHC RBC AUTO-ENTMCNC: 32.4 G/DL (ref 31.5–35.7)
MCV RBC AUTO: 95.1 FL (ref 79–97)
MODALITY: ABNORMAL
MONOCYTES # BLD AUTO: 0.69 10*3/MM3 (ref 0.1–0.9)
MONOCYTES NFR BLD AUTO: 6.4 % (ref 5–12)
NEUTROPHILS NFR BLD AUTO: 6.58 10*3/MM3 (ref 1.7–7)
NEUTROPHILS NFR BLD AUTO: 61.3 % (ref 42.7–76)
NRBC BLD AUTO-RTO: 0 /100 WBC (ref 0–0.2)
NT-PROBNP SERPL-MCNC: 344.1 PG/ML (ref 0–900)
PCO2 BLDA: 53.6 MM HG (ref 35–45)
PH BLDA: 7.33 PH UNITS (ref 7.35–7.45)
PHOSPHATE SERPL-MCNC: 6 MG/DL (ref 2.5–4.5)
PLATELET # BLD AUTO: 376 10*3/MM3 (ref 140–450)
PMV BLD AUTO: 9.4 FL (ref 6–12)
PO2 BLD: 267 MM[HG] (ref 0–500)
PO2 BLDA: 125.6 MM HG (ref 80–100)
POTASSIUM SERPL-SCNC: 4.1 MMOL/L (ref 3.5–5.2)
PROCALCITONIN SERPL-MCNC: 0.08 NG/ML (ref 0–0.25)
PROT SERPL-MCNC: 7.4 G/DL (ref 6–8.5)
PROTHROMBIN TIME: 15 SECONDS (ref 11.8–14.9)
QT INTERVAL: 297 MS
QTC INTERVAL: 409 MS
RBC # BLD AUTO: 4.31 10*6/MM3 (ref 3.77–5.28)
S PNEUM AG SPEC QL LA: NEGATIVE
SAO2 % BLDCOA: 98.5 % (ref 95–99)
SARS-COV-2 RNA RESP QL NAA+PROBE: NOT DETECTED
SODIUM SERPL-SCNC: 140 MMOL/L (ref 136–145)
TROPONIN T % DELTA: 630
TROPONIN T NUMERIC DELTA: 189 NG/L
TROPONIN T SERPL HS-MCNC: 30 NG/L
WBC NRBC COR # BLD AUTO: 10.73 10*3/MM3 (ref 3.4–10.8)
WHOLE BLOOD HOLD COAG: NORMAL
WHOLE BLOOD HOLD SPECIMEN: NORMAL

## 2025-08-23 PROCEDURE — 94799 UNLISTED PULMONARY SVC/PX: CPT

## 2025-08-23 PROCEDURE — 93010 ELECTROCARDIOGRAM REPORT: CPT | Performed by: INTERNAL MEDICINE

## 2025-08-23 PROCEDURE — 85730 THROMBOPLASTIN TIME PARTIAL: CPT | Performed by: INTERNAL MEDICINE

## 2025-08-23 PROCEDURE — 71045 X-RAY EXAM CHEST 1 VIEW: CPT

## 2025-08-23 PROCEDURE — 36415 COLL VENOUS BLD VENIPUNCTURE: CPT | Performed by: PHARMACIST

## 2025-08-23 PROCEDURE — 25510000001 IOPAMIDOL PER 1 ML: Performed by: PHARMACIST

## 2025-08-23 PROCEDURE — 84484 ASSAY OF TROPONIN QUANT: CPT | Performed by: INTERNAL MEDICINE

## 2025-08-23 PROCEDURE — 85025 COMPLETE CBC W/AUTO DIFF WBC: CPT | Performed by: PHARMACIST

## 2025-08-23 PROCEDURE — 83880 ASSAY OF NATRIURETIC PEPTIDE: CPT | Performed by: INTERNAL MEDICINE

## 2025-08-23 PROCEDURE — 25010000002 METHYLPREDNISOLONE PER 40 MG: Performed by: INTERNAL MEDICINE

## 2025-08-23 PROCEDURE — 25810000003 SODIUM CHLORIDE 0.9 % SOLUTION: Performed by: PHARMACIST

## 2025-08-23 PROCEDURE — 82948 REAGENT STRIP/BLOOD GLUCOSE: CPT

## 2025-08-23 PROCEDURE — 82803 BLOOD GASES ANY COMBINATION: CPT | Performed by: PHARMACIST

## 2025-08-23 PROCEDURE — 87205 SMEAR GRAM STAIN: CPT | Performed by: INTERNAL MEDICINE

## 2025-08-23 PROCEDURE — 36600 WITHDRAWAL OF ARTERIAL BLOOD: CPT | Performed by: PHARMACIST

## 2025-08-23 PROCEDURE — 99291 CRITICAL CARE FIRST HOUR: CPT

## 2025-08-23 PROCEDURE — 84484 ASSAY OF TROPONIN QUANT: CPT | Performed by: PHARMACIST

## 2025-08-23 PROCEDURE — 25010000002 HEPARIN (PORCINE) 25000-0.45 UT/250ML-% SOLUTION: Performed by: INTERNAL MEDICINE

## 2025-08-23 PROCEDURE — 25010000002 METHYLPREDNISOLONE PER 125 MG: Performed by: PHARMACIST

## 2025-08-23 PROCEDURE — 84145 PROCALCITONIN (PCT): CPT | Performed by: INTERNAL MEDICINE

## 2025-08-23 PROCEDURE — 25010000002 AMPICILLIN-SULBACTAM PER 1.5 G: Performed by: INTERNAL MEDICINE

## 2025-08-23 PROCEDURE — 80053 COMPREHEN METABOLIC PANEL: CPT | Performed by: PHARMACIST

## 2025-08-23 PROCEDURE — 25010000002 AZITHROMYCIN PER 500 MG: Performed by: PHARMACIST

## 2025-08-23 PROCEDURE — 99223 1ST HOSP IP/OBS HIGH 75: CPT | Performed by: INTERNAL MEDICINE

## 2025-08-23 PROCEDURE — 94664 DEMO&/EVAL PT USE INHALER: CPT

## 2025-08-23 PROCEDURE — 25810000003 SODIUM CHLORIDE 0.9 % SOLUTION 250 ML FLEX CONT: Performed by: PHARMACIST

## 2025-08-23 PROCEDURE — 25010000002 AMPICILLIN-SULBACTAM PER 1.5 G: Performed by: PHARMACIST

## 2025-08-23 PROCEDURE — 87070 CULTURE OTHR SPECIMN AEROBIC: CPT | Performed by: INTERNAL MEDICINE

## 2025-08-23 PROCEDURE — 84100 ASSAY OF PHOSPHORUS: CPT | Performed by: PHARMACIST

## 2025-08-23 PROCEDURE — 93005 ELECTROCARDIOGRAM TRACING: CPT | Performed by: INTERNAL MEDICINE

## 2025-08-23 PROCEDURE — 25010000002 ENOXAPARIN PER 10 MG: Performed by: INTERNAL MEDICINE

## 2025-08-23 PROCEDURE — 94640 AIRWAY INHALATION TREATMENT: CPT

## 2025-08-23 PROCEDURE — 87899 AGENT NOS ASSAY W/OPTIC: CPT | Performed by: INTERNAL MEDICINE

## 2025-08-23 PROCEDURE — 86738 MYCOPLASMA ANTIBODY: CPT | Performed by: INTERNAL MEDICINE

## 2025-08-23 PROCEDURE — 99254 IP/OBS CNSLTJ NEW/EST MOD 60: CPT | Performed by: INTERNAL MEDICINE

## 2025-08-23 PROCEDURE — 71275 CT ANGIOGRAPHY CHEST: CPT

## 2025-08-23 PROCEDURE — 83735 ASSAY OF MAGNESIUM: CPT | Performed by: PHARMACIST

## 2025-08-23 PROCEDURE — 83605 ASSAY OF LACTIC ACID: CPT | Performed by: PHARMACIST

## 2025-08-23 PROCEDURE — 94761 N-INVAS EAR/PLS OXIMETRY MLT: CPT

## 2025-08-23 PROCEDURE — 87449 NOS EACH ORGANISM AG IA: CPT | Performed by: INTERNAL MEDICINE

## 2025-08-23 PROCEDURE — 93005 ELECTROCARDIOGRAM TRACING: CPT | Performed by: PHARMACIST

## 2025-08-23 PROCEDURE — 85610 PROTHROMBIN TIME: CPT | Performed by: PHARMACIST

## 2025-08-23 PROCEDURE — 87040 BLOOD CULTURE FOR BACTERIA: CPT | Performed by: PHARMACIST

## 2025-08-23 PROCEDURE — 87636 SARSCOV2 & INF A&B AMP PRB: CPT | Performed by: PHARMACIST

## 2025-08-23 RX ORDER — HEPARIN SODIUM 10000 [USP'U]/100ML
12 INJECTION, SOLUTION INTRAVENOUS
Status: DISCONTINUED | OUTPATIENT
Start: 2025-08-23 | End: 2025-08-25

## 2025-08-23 RX ORDER — IPRATROPIUM BROMIDE AND ALBUTEROL SULFATE 2.5; .5 MG/3ML; MG/3ML
3 SOLUTION RESPIRATORY (INHALATION) ONCE
Status: COMPLETED | OUTPATIENT
Start: 2025-08-23 | End: 2025-08-23

## 2025-08-23 RX ORDER — IOPAMIDOL 755 MG/ML
100 INJECTION, SOLUTION INTRAVASCULAR
Status: COMPLETED | OUTPATIENT
Start: 2025-08-23 | End: 2025-08-23

## 2025-08-23 RX ORDER — SODIUM CHLORIDE 0.9 % (FLUSH) 0.9 %
10 SYRINGE (ML) INJECTION EVERY 12 HOURS SCHEDULED
Status: DISCONTINUED | OUTPATIENT
Start: 2025-08-23 | End: 2025-08-25

## 2025-08-23 RX ORDER — METHYLPREDNISOLONE SODIUM SUCCINATE 125 MG/2ML
125 INJECTION, POWDER, LYOPHILIZED, FOR SOLUTION INTRAMUSCULAR; INTRAVENOUS ONCE
Status: COMPLETED | OUTPATIENT
Start: 2025-08-23 | End: 2025-08-23

## 2025-08-23 RX ORDER — SODIUM CHLORIDE 0.9 % (FLUSH) 0.9 %
10 SYRINGE (ML) INJECTION AS NEEDED
Status: DISCONTINUED | OUTPATIENT
Start: 2025-08-23 | End: 2025-08-25

## 2025-08-23 RX ORDER — BUDESONIDE 0.5 MG/2ML
0.5 INHALANT ORAL
Status: DISCONTINUED | OUTPATIENT
Start: 2025-08-23 | End: 2025-08-26 | Stop reason: HOSPADM

## 2025-08-23 RX ORDER — ASPIRIN 81 MG/1
81 TABLET ORAL DAILY
Status: DISCONTINUED | OUTPATIENT
Start: 2025-08-23 | End: 2025-08-26 | Stop reason: HOSPADM

## 2025-08-23 RX ORDER — METHYLPREDNISOLONE SODIUM SUCCINATE 40 MG/ML
40 INJECTION, POWDER, LYOPHILIZED, FOR SOLUTION INTRAMUSCULAR; INTRAVENOUS EVERY 8 HOURS
Status: DISCONTINUED | OUTPATIENT
Start: 2025-08-23 | End: 2025-08-24

## 2025-08-23 RX ORDER — ENOXAPARIN SODIUM 100 MG/ML
40 INJECTION SUBCUTANEOUS DAILY
Status: DISCONTINUED | OUTPATIENT
Start: 2025-08-23 | End: 2025-08-23

## 2025-08-23 RX ORDER — ARFORMOTEROL TARTRATE 15 UG/2ML
15 SOLUTION RESPIRATORY (INHALATION)
Status: DISCONTINUED | OUTPATIENT
Start: 2025-08-23 | End: 2025-08-26 | Stop reason: HOSPADM

## 2025-08-23 RX ORDER — SODIUM CHLORIDE 9 MG/ML
40 INJECTION, SOLUTION INTRAVENOUS AS NEEDED
Status: DISCONTINUED | OUTPATIENT
Start: 2025-08-23 | End: 2025-08-25

## 2025-08-23 RX ADMIN — SODIUM CHLORIDE 500 MG: 9 INJECTION, SOLUTION INTRAVENOUS at 09:21

## 2025-08-23 RX ADMIN — IPRATROPIUM BROMIDE AND ALBUTEROL SULFATE 3 ML: .5; 3 SOLUTION RESPIRATORY (INHALATION) at 09:21

## 2025-08-23 RX ADMIN — AMPICILLIN SODIUM AND SULBACTAM SODIUM 3 G: 2; 1 INJECTION, POWDER, FOR SOLUTION INTRAMUSCULAR; INTRAVENOUS at 20:11

## 2025-08-23 RX ADMIN — METHYLPREDNISOLONE SODIUM SUCCINATE 125 MG: 125 INJECTION, POWDER, FOR SOLUTION INTRAMUSCULAR; INTRAVENOUS at 09:16

## 2025-08-23 RX ADMIN — SODIUM CHLORIDE 3 G: 9 INJECTION, SOLUTION INTRAVENOUS at 09:25

## 2025-08-23 RX ADMIN — AMPICILLIN SODIUM AND SULBACTAM SODIUM 3 G: 2; 1 INJECTION, POWDER, FOR SOLUTION INTRAMUSCULAR; INTRAVENOUS at 15:07

## 2025-08-23 RX ADMIN — ASPIRIN 81 MG: 81 TABLET, COATED ORAL at 16:14

## 2025-08-23 RX ADMIN — SODIUM CHLORIDE 1000 ML: 9 INJECTION, SOLUTION INTRAVENOUS at 12:37

## 2025-08-23 RX ADMIN — ARFORMOTEROL TARTRATE 15 MCG: 15 SOLUTION RESPIRATORY (INHALATION) at 21:55

## 2025-08-23 RX ADMIN — SODIUM CHLORIDE 1000 ML: 9 INJECTION, SOLUTION INTRAVENOUS at 09:19

## 2025-08-23 RX ADMIN — METHYLPREDNISOLONE SODIUM SUCCINATE 40 MG: 40 INJECTION, POWDER, FOR SOLUTION INTRAMUSCULAR; INTRAVENOUS at 15:07

## 2025-08-23 RX ADMIN — ENOXAPARIN SODIUM 40 MG: 100 INJECTION SUBCUTANEOUS at 15:07

## 2025-08-23 RX ADMIN — IOPAMIDOL 100 ML: 755 INJECTION, SOLUTION INTRAVENOUS at 10:15

## 2025-08-23 RX ADMIN — IPRATROPIUM BROMIDE AND ALBUTEROL SULFATE 3 ML: .5; 3 SOLUTION RESPIRATORY (INHALATION) at 09:41

## 2025-08-23 RX ADMIN — BUDESONIDE 0.5 MG: 0.5 SUSPENSION RESPIRATORY (INHALATION) at 21:55

## 2025-08-23 RX ADMIN — HEPARIN SODIUM 12 UNITS/KG/HR: 10000 INJECTION, SOLUTION INTRAVENOUS at 16:14

## 2025-08-23 RX ADMIN — Medication 10 ML: at 20:11

## 2025-08-24 ENCOUNTER — APPOINTMENT (OUTPATIENT)
Dept: CARDIOLOGY | Facility: HOSPITAL | Age: 50
End: 2025-08-24
Payer: MEDICAID

## 2025-08-24 LAB
ALBUMIN SERPL-MCNC: 3.4 G/DL (ref 3.5–5.2)
ALBUMIN/GLOB SERPL: 1 G/DL
ALP SERPL-CCNC: 102 U/L (ref 39–117)
ALT SERPL W P-5'-P-CCNC: 24 U/L (ref 1–33)
ANION GAP SERPL CALCULATED.3IONS-SCNC: 9.1 MMOL/L (ref 5–15)
AORTIC DIMENSIONLESS INDEX: 0.8 (DI)
APTT PPP: 108 SECONDS (ref 78–95.9)
APTT PPP: 42.2 SECONDS (ref 78–95.9)
APTT PPP: 64.3 SECONDS (ref 78–95.9)
AST SERPL-CCNC: 15 U/L (ref 1–32)
AV MEAN PRESS GRAD SYS DOP V1V2: 5 MMHG
AV VMAX SYS DOP: 157 CM/SEC
B PARAPERT DNA SPEC QL NAA+PROBE: NOT DETECTED
B PERT DNA SPEC QL NAA+PROBE: NOT DETECTED
BASOPHILS # BLD AUTO: 0.03 10*3/MM3 (ref 0–0.2)
BASOPHILS NFR BLD AUTO: 0.3 % (ref 0–1.5)
BH CV ECHO MEAS - AO MAX PG: 9.9 MMHG
BH CV ECHO MEAS - AO ROOT DIAM: 2.7 CM
BH CV ECHO MEAS - AO V2 VTI: 34.8 CM
BH CV ECHO MEAS - AVA(I,D): 2.5 CM2
BH CV ECHO MEAS - EDV(CUBED): 59.3 ML
BH CV ECHO MEAS - EDV(MOD-SP2): 61.2 ML
BH CV ECHO MEAS - EDV(MOD-SP4): 54.2 ML
BH CV ECHO MEAS - EF(MOD-SP2): 55.9 %
BH CV ECHO MEAS - EF(MOD-SP4): 57.7 %
BH CV ECHO MEAS - ESV(CUBED): 24.4 ML
BH CV ECHO MEAS - ESV(MOD-SP2): 27 ML
BH CV ECHO MEAS - ESV(MOD-SP4): 22.9 ML
BH CV ECHO MEAS - FS: 25.6 %
BH CV ECHO MEAS - IVS/LVPW: 0.8 CM
BH CV ECHO MEAS - IVSD: 0.8 CM
BH CV ECHO MEAS - LA DIMENSION: 2.5 CM
BH CV ECHO MEAS - LAT PEAK E' VEL: 14 CM/SEC
BH CV ECHO MEAS - LV DIASTOLIC VOL/BSA (35-75): 28.9 CM2
BH CV ECHO MEAS - LV MASS(C)D: 105.3 GRAMS
BH CV ECHO MEAS - LV MAX PG: 6.2 MMHG
BH CV ECHO MEAS - LV MEAN PG: 3 MMHG
BH CV ECHO MEAS - LV SYSTOLIC VOL/BSA (12-30): 12.2 CM2
BH CV ECHO MEAS - LV V1 MAX: 124 CM/SEC
BH CV ECHO MEAS - LV V1 VTI: 27.8 CM
BH CV ECHO MEAS - LVIDD: 3.9 CM
BH CV ECHO MEAS - LVIDS: 2.9 CM
BH CV ECHO MEAS - LVOT AREA: 3.1 CM2
BH CV ECHO MEAS - LVOT DIAM: 2 CM
BH CV ECHO MEAS - LVPWD: 1 CM
BH CV ECHO MEAS - MED PEAK E' VEL: 9.7 CM/SEC
BH CV ECHO MEAS - MV A MAX VEL: 81.8 CM/SEC
BH CV ECHO MEAS - MV DEC SLOPE: 395 CM/SEC2
BH CV ECHO MEAS - MV DEC TIME: 0.33 SEC
BH CV ECHO MEAS - MV E MAX VEL: 97.7 CM/SEC
BH CV ECHO MEAS - MV E/A: 1.19
BH CV ECHO MEAS - MV MEAN PG: 2 MMHG
BH CV ECHO MEAS - MV V2 VTI: 29.1 CM
BH CV ECHO MEAS - MVA(VTI): 3 CM2
BH CV ECHO MEAS - RVDD: 2.4 CM
BH CV ECHO MEAS - SV(LVOT): 87.3 ML
BH CV ECHO MEAS - SV(MOD-SP2): 34.2 ML
BH CV ECHO MEAS - SV(MOD-SP4): 31.3 ML
BH CV ECHO MEAS - SVI(LVOT): 46.5 ML/M2
BH CV ECHO MEAS - SVI(MOD-SP2): 18.2 ML/M2
BH CV ECHO MEAS - SVI(MOD-SP4): 16.7 ML/M2
BH CV ECHO MEAS - TAPSE (>1.6): 2.42 CM
BH CV ECHO MEAS - TR MAX PG: 26 MMHG
BH CV ECHO MEAS - TR MAX VEL: 255 CM/SEC
BH CV ECHO MEASUREMENTS AVERAGE E/E' RATIO: 8.24
BILIRUB SERPL-MCNC: 0.3 MG/DL (ref 0–1.2)
BUN SERPL-MCNC: 12.8 MG/DL (ref 6–20)
BUN/CREAT SERPL: 20.3 (ref 7–25)
C PNEUM DNA NPH QL NAA+NON-PROBE: NOT DETECTED
CALCIUM SPEC-SCNC: 9 MG/DL (ref 8.6–10.5)
CHLORIDE SERPL-SCNC: 101 MMOL/L (ref 98–107)
CO2 SERPL-SCNC: 23.9 MMOL/L (ref 22–29)
CREAT SERPL-MCNC: 0.63 MG/DL (ref 0.57–1)
DEPRECATED RDW RBC AUTO: 40.7 FL (ref 37–54)
EGFRCR SERPLBLD CKD-EPI 2021: 107.6 ML/MIN/1.73
EOSINOPHIL # BLD AUTO: 0 10*3/MM3 (ref 0–0.4)
EOSINOPHIL NFR BLD AUTO: 0 % (ref 0.3–6.2)
ERYTHROCYTE [DISTWIDTH] IN BLOOD BY AUTOMATED COUNT: 12 % (ref 12.3–15.4)
FLUAV SUBTYP SPEC NAA+PROBE: NOT DETECTED
FLUBV RNA NPH QL NAA+NON-PROBE: NOT DETECTED
GLOBULIN UR ELPH-MCNC: 3.3 GM/DL
GLUCOSE BLDC GLUCOMTR-MCNC: 117 MG/DL (ref 70–99)
GLUCOSE BLDC GLUCOMTR-MCNC: 128 MG/DL (ref 70–99)
GLUCOSE BLDC GLUCOMTR-MCNC: 131 MG/DL (ref 70–99)
GLUCOSE BLDC GLUCOMTR-MCNC: 133 MG/DL (ref 70–99)
GLUCOSE BLDC GLUCOMTR-MCNC: 148 MG/DL (ref 70–99)
GLUCOSE SERPL-MCNC: 123 MG/DL (ref 65–99)
HADV DNA SPEC NAA+PROBE: NOT DETECTED
HCOV 229E RNA SPEC QL NAA+PROBE: NOT DETECTED
HCOV HKU1 RNA SPEC QL NAA+PROBE: NOT DETECTED
HCOV NL63 RNA SPEC QL NAA+PROBE: NOT DETECTED
HCOV OC43 RNA SPEC QL NAA+PROBE: NOT DETECTED
HCT VFR BLD AUTO: 37.8 % (ref 34–46.6)
HGB BLD-MCNC: 12.4 G/DL (ref 12–15.9)
HMPV RNA NPH QL NAA+NON-PROBE: NOT DETECTED
HPIV1 RNA ISLT QL NAA+PROBE: NOT DETECTED
HPIV2 RNA SPEC QL NAA+PROBE: NOT DETECTED
HPIV3 RNA NPH QL NAA+PROBE: NOT DETECTED
HPIV4 P GENE NPH QL NAA+PROBE: NOT DETECTED
IMM GRANULOCYTES # BLD AUTO: 0.06 10*3/MM3 (ref 0–0.05)
IMM GRANULOCYTES NFR BLD AUTO: 0.5 % (ref 0–0.5)
IVRT: 79 MS
LV EF BIPLANE MOD: 55.5 %
LYMPHOCYTES # BLD AUTO: 1.46 10*3/MM3 (ref 0.7–3.1)
LYMPHOCYTES NFR BLD AUTO: 12.4 % (ref 19.6–45.3)
M PNEUMO IGG SER IA-ACNC: NOT DETECTED
MAGNESIUM SERPL-MCNC: 2.1 MG/DL (ref 1.6–2.6)
MCH RBC QN AUTO: 30.3 PG (ref 26.6–33)
MCHC RBC AUTO-ENTMCNC: 32.8 G/DL (ref 31.5–35.7)
MCV RBC AUTO: 92.4 FL (ref 79–97)
MONOCYTES # BLD AUTO: 0.26 10*3/MM3 (ref 0.1–0.9)
MONOCYTES NFR BLD AUTO: 2.2 % (ref 5–12)
NEUTROPHILS NFR BLD AUTO: 84.6 % (ref 42.7–76)
NEUTROPHILS NFR BLD AUTO: 9.94 10*3/MM3 (ref 1.7–7)
NRBC BLD AUTO-RTO: 0 /100 WBC (ref 0–0.2)
PLATELET # BLD AUTO: 290 10*3/MM3 (ref 140–450)
PMV BLD AUTO: 9.6 FL (ref 6–12)
POTASSIUM SERPL-SCNC: 4.3 MMOL/L (ref 3.5–5.2)
PROCALCITONIN SERPL-MCNC: 0.09 NG/ML (ref 0–0.25)
PROT SERPL-MCNC: 6.7 G/DL (ref 6–8.5)
QT INTERVAL: 372 MS
QTC INTERVAL: 429 MS
RBC # BLD AUTO: 4.09 10*6/MM3 (ref 3.77–5.28)
RHINOVIRUS RNA SPEC NAA+PROBE: NOT DETECTED
RSV RNA NPH QL NAA+NON-PROBE: NOT DETECTED
SARS-COV-2 RNA RESP QL NAA+PROBE: NOT DETECTED
SODIUM SERPL-SCNC: 134 MMOL/L (ref 136–145)
WBC NRBC COR # BLD AUTO: 11.75 10*3/MM3 (ref 3.4–10.8)

## 2025-08-24 PROCEDURE — 93306 TTE W/DOPPLER COMPLETE: CPT | Performed by: INTERNAL MEDICINE

## 2025-08-24 PROCEDURE — 99222 1ST HOSP IP/OBS MODERATE 55: CPT | Performed by: STUDENT IN AN ORGANIZED HEALTH CARE EDUCATION/TRAINING PROGRAM

## 2025-08-24 PROCEDURE — 99232 SBSQ HOSP IP/OBS MODERATE 35: CPT | Performed by: INTERNAL MEDICINE

## 2025-08-24 PROCEDURE — 85025 COMPLETE CBC W/AUTO DIFF WBC: CPT | Performed by: INTERNAL MEDICINE

## 2025-08-24 PROCEDURE — 84145 PROCALCITONIN (PCT): CPT | Performed by: INTERNAL MEDICINE

## 2025-08-24 PROCEDURE — 82948 REAGENT STRIP/BLOOD GLUCOSE: CPT | Performed by: INTERNAL MEDICINE

## 2025-08-24 PROCEDURE — 82948 REAGENT STRIP/BLOOD GLUCOSE: CPT

## 2025-08-24 PROCEDURE — 94761 N-INVAS EAR/PLS OXIMETRY MLT: CPT

## 2025-08-24 PROCEDURE — 85730 THROMBOPLASTIN TIME PARTIAL: CPT | Performed by: INTERNAL MEDICINE

## 2025-08-24 PROCEDURE — 25010000002 METHYLPREDNISOLONE PER 40 MG: Performed by: INTERNAL MEDICINE

## 2025-08-24 PROCEDURE — 94799 UNLISTED PULMONARY SVC/PX: CPT

## 2025-08-24 PROCEDURE — 83735 ASSAY OF MAGNESIUM: CPT | Performed by: INTERNAL MEDICINE

## 2025-08-24 PROCEDURE — 94664 DEMO&/EVAL PT USE INHALER: CPT

## 2025-08-24 PROCEDURE — 25010000002 HEPARIN (PORCINE) 25000-0.45 UT/250ML-% SOLUTION: Performed by: INTERNAL MEDICINE

## 2025-08-24 PROCEDURE — 25010000002 AMPICILLIN-SULBACTAM PER 1.5 G: Performed by: INTERNAL MEDICINE

## 2025-08-24 PROCEDURE — 93306 TTE W/DOPPLER COMPLETE: CPT

## 2025-08-24 PROCEDURE — 25010000002 METHYLPREDNISOLONE PER 40 MG: Performed by: NURSE PRACTITIONER

## 2025-08-24 PROCEDURE — 80053 COMPREHEN METABOLIC PANEL: CPT | Performed by: INTERNAL MEDICINE

## 2025-08-24 PROCEDURE — 0202U NFCT DS 22 TRGT SARS-COV-2: CPT | Performed by: NURSE PRACTITIONER

## 2025-08-24 RX ORDER — ROSUVASTATIN CALCIUM 5 MG/1
10 TABLET, COATED ORAL NIGHTLY
Status: DISCONTINUED | OUTPATIENT
Start: 2025-08-24 | End: 2025-08-26 | Stop reason: HOSPADM

## 2025-08-24 RX ORDER — INSULIN LISPRO 100 [IU]/ML
2-9 INJECTION, SOLUTION INTRAVENOUS; SUBCUTANEOUS
Status: DISCONTINUED | OUTPATIENT
Start: 2025-08-24 | End: 2025-08-26 | Stop reason: HOSPADM

## 2025-08-24 RX ORDER — METHYLPREDNISOLONE SODIUM SUCCINATE 40 MG/ML
40 INJECTION, POWDER, LYOPHILIZED, FOR SOLUTION INTRAMUSCULAR; INTRAVENOUS EVERY 12 HOURS
Status: DISCONTINUED | OUTPATIENT
Start: 2025-08-24 | End: 2025-08-25

## 2025-08-24 RX ORDER — IPRATROPIUM BROMIDE AND ALBUTEROL SULFATE 2.5; .5 MG/3ML; MG/3ML
3 SOLUTION RESPIRATORY (INHALATION)
Status: DISCONTINUED | OUTPATIENT
Start: 2025-08-24 | End: 2025-08-26 | Stop reason: HOSPADM

## 2025-08-24 RX ORDER — BUPRENORPHINE HYDROCHLORIDE AND NALOXONE HYDROCHLORIDE DIHYDRATE 8; 2 MG/1; MG/1
8 TABLET SUBLINGUAL DAILY
Status: DISCONTINUED | OUTPATIENT
Start: 2025-08-24 | End: 2025-08-26 | Stop reason: HOSPADM

## 2025-08-24 RX ORDER — NICOTINE POLACRILEX 4 MG
15 LOZENGE BUCCAL
Status: DISCONTINUED | OUTPATIENT
Start: 2025-08-24 | End: 2025-08-26 | Stop reason: HOSPADM

## 2025-08-24 RX ORDER — QUETIAPINE FUMARATE 100 MG/1
100 TABLET, FILM COATED ORAL NIGHTLY
Status: DISCONTINUED | OUTPATIENT
Start: 2025-08-24 | End: 2025-08-26 | Stop reason: HOSPADM

## 2025-08-24 RX ORDER — SPIRONOLACTONE 25 MG/1
25 TABLET ORAL DAILY
Status: DISCONTINUED | OUTPATIENT
Start: 2025-08-24 | End: 2025-08-26 | Stop reason: HOSPADM

## 2025-08-24 RX ORDER — IBUPROFEN 600 MG/1
1 TABLET ORAL
Status: DISCONTINUED | OUTPATIENT
Start: 2025-08-24 | End: 2025-08-26 | Stop reason: HOSPADM

## 2025-08-24 RX ORDER — PAROXETINE 20 MG/1
20 TABLET, FILM COATED ORAL DAILY
Status: DISCONTINUED | OUTPATIENT
Start: 2025-08-24 | End: 2025-08-26 | Stop reason: HOSPADM

## 2025-08-24 RX ORDER — AZITHROMYCIN 250 MG/1
500 TABLET, FILM COATED ORAL
Status: COMPLETED | OUTPATIENT
Start: 2025-08-24 | End: 2025-08-25

## 2025-08-24 RX ORDER — DEXTROSE MONOHYDRATE 25 G/50ML
25 INJECTION, SOLUTION INTRAVENOUS
Status: DISCONTINUED | OUTPATIENT
Start: 2025-08-24 | End: 2025-08-26 | Stop reason: HOSPADM

## 2025-08-24 RX ADMIN — SPIRONOLACTONE 25 MG: 25 TABLET ORAL at 11:04

## 2025-08-24 RX ADMIN — AZITHROMYCIN 500 MG: 250 TABLET, FILM COATED ORAL at 15:00

## 2025-08-24 RX ADMIN — PAROXETINE HYDROCHLORIDE 20 MG: 20 TABLET, FILM COATED ORAL at 16:01

## 2025-08-24 RX ADMIN — BUDESONIDE 0.5 MG: 0.5 SUSPENSION RESPIRATORY (INHALATION) at 20:35

## 2025-08-24 RX ADMIN — ROSUVASTATIN CALCIUM 10 MG: 5 TABLET, FILM COATED ORAL at 21:04

## 2025-08-24 RX ADMIN — METHYLPREDNISOLONE SODIUM SUCCINATE 40 MG: 40 INJECTION, POWDER, FOR SOLUTION INTRAMUSCULAR; INTRAVENOUS at 19:26

## 2025-08-24 RX ADMIN — ARFORMOTEROL TARTRATE 15 MCG: 15 SOLUTION RESPIRATORY (INHALATION) at 20:35

## 2025-08-24 RX ADMIN — Medication 10 ML: at 08:25

## 2025-08-24 RX ADMIN — AMPICILLIN SODIUM AND SULBACTAM SODIUM 3 G: 2; 1 INJECTION, POWDER, FOR SOLUTION INTRAMUSCULAR; INTRAVENOUS at 02:38

## 2025-08-24 RX ADMIN — AMPICILLIN SODIUM AND SULBACTAM SODIUM 3 G: 2; 1 INJECTION, POWDER, FOR SOLUTION INTRAMUSCULAR; INTRAVENOUS at 21:04

## 2025-08-24 RX ADMIN — IPRATROPIUM BROMIDE AND ALBUTEROL SULFATE 3 ML: .5; 3 SOLUTION RESPIRATORY (INHALATION) at 20:35

## 2025-08-24 RX ADMIN — AMPICILLIN SODIUM AND SULBACTAM SODIUM 3 G: 2; 1 INJECTION, POWDER, FOR SOLUTION INTRAMUSCULAR; INTRAVENOUS at 08:25

## 2025-08-24 RX ADMIN — Medication 10 ML: at 21:04

## 2025-08-24 RX ADMIN — ARFORMOTEROL TARTRATE 15 MCG: 15 SOLUTION RESPIRATORY (INHALATION) at 09:39

## 2025-08-24 RX ADMIN — IPRATROPIUM BROMIDE AND ALBUTEROL SULFATE 3 ML: .5; 3 SOLUTION RESPIRATORY (INHALATION) at 15:40

## 2025-08-24 RX ADMIN — ASPIRIN 81 MG: 81 TABLET, COATED ORAL at 08:25

## 2025-08-24 RX ADMIN — HEPARIN SODIUM 14 UNITS/KG/HR: 10000 INJECTION, SOLUTION INTRAVENOUS at 11:06

## 2025-08-24 RX ADMIN — METHYLPREDNISOLONE SODIUM SUCCINATE 40 MG: 40 INJECTION, POWDER, FOR SOLUTION INTRAMUSCULAR; INTRAVENOUS at 00:01

## 2025-08-24 RX ADMIN — HEPARIN SODIUM 17 UNITS/KG/HR: 10000 INJECTION, SOLUTION INTRAVENOUS at 15:11

## 2025-08-24 RX ADMIN — BUPRENORPHINE HYDROCHLORIDE AND NALOXONE HYDROCHLORIDE DIHYDRATE 8 MG: 8; 2 TABLET SUBLINGUAL at 16:01

## 2025-08-24 RX ADMIN — QUETIAPINE FUMARATE 100 MG: 100 TABLET ORAL at 21:04

## 2025-08-24 RX ADMIN — BUDESONIDE 0.5 MG: 0.5 SUSPENSION RESPIRATORY (INHALATION) at 09:39

## 2025-08-24 RX ADMIN — METHYLPREDNISOLONE SODIUM SUCCINATE 40 MG: 40 INJECTION, POWDER, FOR SOLUTION INTRAMUSCULAR; INTRAVENOUS at 05:35

## 2025-08-24 RX ADMIN — AMPICILLIN SODIUM AND SULBACTAM SODIUM 3 G: 2; 1 INJECTION, POWDER, FOR SOLUTION INTRAMUSCULAR; INTRAVENOUS at 15:00

## 2025-08-25 DIAGNOSIS — M54.6 CHRONIC MIDLINE THORACIC BACK PAIN: ICD-10-CM

## 2025-08-25 DIAGNOSIS — G89.29 CHRONIC MIDLINE THORACIC BACK PAIN: ICD-10-CM

## 2025-08-25 PROBLEM — R57.9 SHOCK, UNSPECIFIED: Status: ACTIVE | Noted: 2025-08-25

## 2025-08-25 PROBLEM — J18.9 PNEUMONIA, UNSPECIFIED ORGANISM: Status: ACTIVE | Noted: 2025-08-25

## 2025-08-25 LAB
ALBUMIN SERPL-MCNC: 3.1 G/DL (ref 3.5–5.2)
ALBUMIN/GLOB SERPL: 1 G/DL
ALP SERPL-CCNC: 82 U/L (ref 39–117)
ALT SERPL W P-5'-P-CCNC: 17 U/L (ref 1–33)
ANION GAP SERPL CALCULATED.3IONS-SCNC: 8.6 MMOL/L (ref 5–15)
APTT PPP: 70.4 SECONDS (ref 78–95.9)
AST SERPL-CCNC: 9 U/L (ref 1–32)
BASOPHILS # BLD AUTO: 0.02 10*3/MM3 (ref 0–0.2)
BASOPHILS NFR BLD AUTO: 0.2 % (ref 0–1.5)
BILIRUB SERPL-MCNC: 0.2 MG/DL (ref 0–1.2)
BUN SERPL-MCNC: 16.7 MG/DL (ref 6–20)
BUN/CREAT SERPL: 22.6 (ref 7–25)
CALCIUM SPEC-SCNC: 9.1 MG/DL (ref 8.6–10.5)
CHLORIDE SERPL-SCNC: 102 MMOL/L (ref 98–107)
CHOLEST SERPL-MCNC: 184 MG/DL (ref 0–200)
CO2 SERPL-SCNC: 26.4 MMOL/L (ref 22–29)
CREAT SERPL-MCNC: 0.74 MG/DL (ref 0.57–1)
DEPRECATED RDW RBC AUTO: 41.5 FL (ref 37–54)
EGFRCR SERPLBLD CKD-EPI 2021: 98.1 ML/MIN/1.73
EOSINOPHIL # BLD AUTO: 0 10*3/MM3 (ref 0–0.4)
EOSINOPHIL NFR BLD AUTO: 0 % (ref 0.3–6.2)
ERYTHROCYTE [DISTWIDTH] IN BLOOD BY AUTOMATED COUNT: 11.9 % (ref 12.3–15.4)
GLOBULIN UR ELPH-MCNC: 3.2 GM/DL
GLUCOSE BLDC GLUCOMTR-MCNC: 100 MG/DL (ref 70–99)
GLUCOSE BLDC GLUCOMTR-MCNC: 123 MG/DL (ref 70–99)
GLUCOSE BLDC GLUCOMTR-MCNC: 139 MG/DL (ref 70–99)
GLUCOSE BLDC GLUCOMTR-MCNC: NORMAL MG/DL
GLUCOSE SERPL-MCNC: 106 MG/DL (ref 65–99)
HCT VFR BLD AUTO: 38.7 % (ref 34–46.6)
HDLC SERPL-MCNC: 50 MG/DL (ref 40–60)
HGB BLD-MCNC: 12.7 G/DL (ref 12–15.9)
IMM GRANULOCYTES # BLD AUTO: 0.05 10*3/MM3 (ref 0–0.05)
IMM GRANULOCYTES NFR BLD AUTO: 0.5 % (ref 0–0.5)
LDLC SERPL CALC-MCNC: 120 MG/DL (ref 0–100)
LDLC/HDLC SERPL: 2.37 {RATIO}
LYMPHOCYTES # BLD AUTO: 2.14 10*3/MM3 (ref 0.7–3.1)
LYMPHOCYTES NFR BLD AUTO: 19.7 % (ref 19.6–45.3)
MCH RBC QN AUTO: 31.2 PG (ref 26.6–33)
MCHC RBC AUTO-ENTMCNC: 32.8 G/DL (ref 31.5–35.7)
MCV RBC AUTO: 95.1 FL (ref 79–97)
MONOCYTES # BLD AUTO: 0.46 10*3/MM3 (ref 0.1–0.9)
MONOCYTES NFR BLD AUTO: 4.2 % (ref 5–12)
NEUTROPHILS NFR BLD AUTO: 75.4 % (ref 42.7–76)
NEUTROPHILS NFR BLD AUTO: 8.22 10*3/MM3 (ref 1.7–7)
NRBC BLD AUTO-RTO: 0 /100 WBC (ref 0–0.2)
PLATELET # BLD AUTO: 283 10*3/MM3 (ref 140–450)
PMV BLD AUTO: 9.6 FL (ref 6–12)
POTASSIUM SERPL-SCNC: 4.3 MMOL/L (ref 3.5–5.2)
PROT SERPL-MCNC: 6.3 G/DL (ref 6–8.5)
RBC # BLD AUTO: 4.07 10*6/MM3 (ref 3.77–5.28)
SODIUM SERPL-SCNC: 137 MMOL/L (ref 136–145)
TRIGL SERPL-MCNC: 77 MG/DL (ref 0–150)
VLDLC SERPL-MCNC: 14 MG/DL (ref 5–40)
WBC NRBC COR # BLD AUTO: 10.89 10*3/MM3 (ref 3.4–10.8)
WHOLE BLOOD HOLD SPECIMEN: NORMAL

## 2025-08-25 PROCEDURE — 25010000002 AMPICILLIN-SULBACTAM PER 1.5 G: Performed by: INTERNAL MEDICINE

## 2025-08-25 PROCEDURE — C1769 GUIDE WIRE: HCPCS | Performed by: INTERNAL MEDICINE

## 2025-08-25 PROCEDURE — 82948 REAGENT STRIP/BLOOD GLUCOSE: CPT | Performed by: INTERNAL MEDICINE

## 2025-08-25 PROCEDURE — 85025 COMPLETE CBC W/AUTO DIFF WBC: CPT | Performed by: INTERNAL MEDICINE

## 2025-08-25 PROCEDURE — 82948 REAGENT STRIP/BLOOD GLUCOSE: CPT

## 2025-08-25 PROCEDURE — 80061 LIPID PANEL: CPT | Performed by: INTERNAL MEDICINE

## 2025-08-25 PROCEDURE — 99232 SBSQ HOSP IP/OBS MODERATE 35: CPT | Performed by: INTERNAL MEDICINE

## 2025-08-25 PROCEDURE — 25010000002 HEPARIN (PORCINE) PER 1000 UNITS: Performed by: INTERNAL MEDICINE

## 2025-08-25 PROCEDURE — 25010000002 METHYLPREDNISOLONE PER 40 MG: Performed by: NURSE PRACTITIONER

## 2025-08-25 PROCEDURE — 25810000003 SODIUM CHLORIDE 0.9 % SOLUTION: Performed by: INTERNAL MEDICINE

## 2025-08-25 PROCEDURE — 25010000002 MIDAZOLAM PER 1MG: Performed by: INTERNAL MEDICINE

## 2025-08-25 PROCEDURE — 94799 UNLISTED PULMONARY SVC/PX: CPT

## 2025-08-25 PROCEDURE — 85730 THROMBOPLASTIN TIME PARTIAL: CPT | Performed by: INTERNAL MEDICINE

## 2025-08-25 PROCEDURE — 63710000001 PREDNISONE PER 1 MG: Performed by: NURSE PRACTITIONER

## 2025-08-25 PROCEDURE — 25010000002 LIDOCAINE 2% SOLUTION: Performed by: INTERNAL MEDICINE

## 2025-08-25 PROCEDURE — 97165 OT EVAL LOW COMPLEX 30 MIN: CPT

## 2025-08-25 PROCEDURE — 93458 L HRT ARTERY/VENTRICLE ANGIO: CPT | Performed by: INTERNAL MEDICINE

## 2025-08-25 PROCEDURE — C1894 INTRO/SHEATH, NON-LASER: HCPCS | Performed by: INTERNAL MEDICINE

## 2025-08-25 PROCEDURE — 80053 COMPREHEN METABOLIC PANEL: CPT | Performed by: INTERNAL MEDICINE

## 2025-08-25 PROCEDURE — 25010000002 HEPARIN (PORCINE) 25000-0.45 UT/250ML-% SOLUTION: Performed by: INTERNAL MEDICINE

## 2025-08-25 PROCEDURE — 94761 N-INVAS EAR/PLS OXIMETRY MLT: CPT

## 2025-08-25 PROCEDURE — 25510000001 IOPAMIDOL PER 1 ML: Performed by: INTERNAL MEDICINE

## 2025-08-25 PROCEDURE — 94664 DEMO&/EVAL PT USE INHALER: CPT

## 2025-08-25 RX ORDER — NITROGLYCERIN 0.4 MG/1
0.4 TABLET SUBLINGUAL
Status: DISCONTINUED | OUTPATIENT
Start: 2025-08-25 | End: 2025-08-26 | Stop reason: HOSPADM

## 2025-08-25 RX ORDER — ACETAMINOPHEN 325 MG/1
650 TABLET ORAL EVERY 4 HOURS PRN
Status: DISCONTINUED | OUTPATIENT
Start: 2025-08-25 | End: 2025-08-26 | Stop reason: HOSPADM

## 2025-08-25 RX ORDER — MORPHINE SULFATE 2 MG/ML
1 INJECTION, SOLUTION INTRAMUSCULAR; INTRAVENOUS EVERY 4 HOURS PRN
Status: DISCONTINUED | OUTPATIENT
Start: 2025-08-25 | End: 2025-08-26 | Stop reason: HOSPADM

## 2025-08-25 RX ORDER — LIDOCAINE HYDROCHLORIDE 20 MG/ML
INJECTION, SOLUTION INFILTRATION; PERINEURAL
Status: DISCONTINUED | OUTPATIENT
Start: 2025-08-25 | End: 2025-08-25 | Stop reason: HOSPADM

## 2025-08-25 RX ORDER — IOPAMIDOL 755 MG/ML
INJECTION, SOLUTION INTRAVASCULAR
Status: DISCONTINUED | OUTPATIENT
Start: 2025-08-25 | End: 2025-08-25 | Stop reason: HOSPADM

## 2025-08-25 RX ORDER — PREDNISONE 20 MG/1
40 TABLET ORAL
Status: DISCONTINUED | OUTPATIENT
Start: 2025-08-25 | End: 2025-08-26 | Stop reason: HOSPADM

## 2025-08-25 RX ORDER — SODIUM CHLORIDE 9 MG/ML
100 INJECTION, SOLUTION INTRAVENOUS CONTINUOUS
Status: ACTIVE | OUTPATIENT
Start: 2025-08-25 | End: 2025-08-25

## 2025-08-25 RX ORDER — VERAPAMIL HYDROCHLORIDE 2.5 MG/ML
INJECTION INTRAVENOUS
Status: DISCONTINUED | OUTPATIENT
Start: 2025-08-25 | End: 2025-08-25 | Stop reason: HOSPADM

## 2025-08-25 RX ORDER — NALOXONE HCL 0.4 MG/ML
0.4 VIAL (ML) INJECTION
Status: DISCONTINUED | OUTPATIENT
Start: 2025-08-25 | End: 2025-08-26 | Stop reason: HOSPADM

## 2025-08-25 RX ORDER — ONDANSETRON 2 MG/ML
4 INJECTION INTRAMUSCULAR; INTRAVENOUS EVERY 6 HOURS PRN
Status: DISCONTINUED | OUTPATIENT
Start: 2025-08-25 | End: 2025-08-26 | Stop reason: HOSPADM

## 2025-08-25 RX ORDER — MIDAZOLAM HYDROCHLORIDE 2 MG/2ML
INJECTION, SOLUTION INTRAMUSCULAR; INTRAVENOUS
Status: DISCONTINUED | OUTPATIENT
Start: 2025-08-25 | End: 2025-08-25 | Stop reason: HOSPADM

## 2025-08-25 RX ORDER — ONDANSETRON 4 MG/1
4 TABLET, ORALLY DISINTEGRATING ORAL EVERY 6 HOURS PRN
Status: DISCONTINUED | OUTPATIENT
Start: 2025-08-25 | End: 2025-08-26 | Stop reason: HOSPADM

## 2025-08-25 RX ORDER — HEPARIN SODIUM 1000 [USP'U]/ML
INJECTION, SOLUTION INTRAVENOUS; SUBCUTANEOUS
Status: DISCONTINUED | OUTPATIENT
Start: 2025-08-25 | End: 2025-08-25 | Stop reason: HOSPADM

## 2025-08-25 RX ADMIN — ROSUVASTATIN CALCIUM 10 MG: 5 TABLET, FILM COATED ORAL at 20:53

## 2025-08-25 RX ADMIN — HEPARIN SODIUM 16 UNITS/KG/HR: 10000 INJECTION, SOLUTION INTRAVENOUS at 05:37

## 2025-08-25 RX ADMIN — ARFORMOTEROL TARTRATE 15 MCG: 15 SOLUTION RESPIRATORY (INHALATION) at 18:31

## 2025-08-25 RX ADMIN — ASPIRIN 81 MG: 81 TABLET, COATED ORAL at 09:03

## 2025-08-25 RX ADMIN — PAROXETINE HYDROCHLORIDE 20 MG: 20 TABLET, FILM COATED ORAL at 09:03

## 2025-08-25 RX ADMIN — IPRATROPIUM BROMIDE AND ALBUTEROL SULFATE 3 ML: .5; 3 SOLUTION RESPIRATORY (INHALATION) at 18:31

## 2025-08-25 RX ADMIN — IPRATROPIUM BROMIDE AND ALBUTEROL SULFATE 3 ML: .5; 3 SOLUTION RESPIRATORY (INHALATION) at 12:47

## 2025-08-25 RX ADMIN — AMPICILLIN SODIUM AND SULBACTAM SODIUM 3 G: 2; 1 INJECTION, POWDER, FOR SOLUTION INTRAMUSCULAR; INTRAVENOUS at 20:54

## 2025-08-25 RX ADMIN — BUPRENORPHINE HYDROCHLORIDE AND NALOXONE HYDROCHLORIDE DIHYDRATE 8 MG: 8; 2 TABLET SUBLINGUAL at 09:03

## 2025-08-25 RX ADMIN — METHYLPREDNISOLONE SODIUM SUCCINATE 40 MG: 40 INJECTION, POWDER, FOR SOLUTION INTRAMUSCULAR; INTRAVENOUS at 06:06

## 2025-08-25 RX ADMIN — AMPICILLIN SODIUM AND SULBACTAM SODIUM 3 G: 2; 1 INJECTION, POWDER, FOR SOLUTION INTRAMUSCULAR; INTRAVENOUS at 09:03

## 2025-08-25 RX ADMIN — PREDNISONE 40 MG: 20 TABLET ORAL at 09:03

## 2025-08-25 RX ADMIN — AMPICILLIN SODIUM AND SULBACTAM SODIUM 3 G: 2; 1 INJECTION, POWDER, FOR SOLUTION INTRAMUSCULAR; INTRAVENOUS at 15:38

## 2025-08-25 RX ADMIN — Medication 10 ML: at 09:03

## 2025-08-25 RX ADMIN — AMPICILLIN SODIUM AND SULBACTAM SODIUM 3 G: 2; 1 INJECTION, POWDER, FOR SOLUTION INTRAMUSCULAR; INTRAVENOUS at 03:53

## 2025-08-25 RX ADMIN — BUDESONIDE 0.5 MG: 0.5 SUSPENSION RESPIRATORY (INHALATION) at 18:31

## 2025-08-25 RX ADMIN — QUETIAPINE FUMARATE 100 MG: 100 TABLET ORAL at 20:53

## 2025-08-25 RX ADMIN — SODIUM CHLORIDE 100 ML/HR: 9 INJECTION, SOLUTION INTRAVENOUS at 12:35

## 2025-08-25 RX ADMIN — AZITHROMYCIN 500 MG: 250 TABLET, FILM COATED ORAL at 09:03

## 2025-08-26 VITALS
HEIGHT: 67 IN | WEIGHT: 168.43 LBS | OXYGEN SATURATION: 94 % | HEART RATE: 71 BPM | SYSTOLIC BLOOD PRESSURE: 160 MMHG | RESPIRATION RATE: 18 BRPM | DIASTOLIC BLOOD PRESSURE: 100 MMHG | TEMPERATURE: 98.1 F | BODY MASS INDEX: 26.44 KG/M2

## 2025-08-26 LAB
ALBUMIN SERPL-MCNC: 3.3 G/DL (ref 3.5–5.2)
ALBUMIN/GLOB SERPL: 1.1 G/DL
ALP SERPL-CCNC: 83 U/L (ref 39–117)
ALT SERPL W P-5'-P-CCNC: 15 U/L (ref 1–33)
ANION GAP SERPL CALCULATED.3IONS-SCNC: 9.3 MMOL/L (ref 5–15)
AST SERPL-CCNC: 10 U/L (ref 1–32)
BACTERIA SPEC RESP CULT: NORMAL
BASOPHILS # BLD AUTO: 0.01 10*3/MM3 (ref 0–0.2)
BASOPHILS NFR BLD AUTO: 0.1 % (ref 0–1.5)
BILIRUB SERPL-MCNC: 0.2 MG/DL (ref 0–1.2)
BUN SERPL-MCNC: 16.7 MG/DL (ref 6–20)
BUN/CREAT SERPL: 21.7 (ref 7–25)
CALCIUM SPEC-SCNC: 9.1 MG/DL (ref 8.6–10.5)
CHLORIDE SERPL-SCNC: 101 MMOL/L (ref 98–107)
CO2 SERPL-SCNC: 27.7 MMOL/L (ref 22–29)
CREAT SERPL-MCNC: 0.77 MG/DL (ref 0.57–1)
DEPRECATED RDW RBC AUTO: 40.2 FL (ref 37–54)
EGFRCR SERPLBLD CKD-EPI 2021: 93.5 ML/MIN/1.73
EOSINOPHIL # BLD AUTO: 0.02 10*3/MM3 (ref 0–0.4)
EOSINOPHIL NFR BLD AUTO: 0.2 % (ref 0.3–6.2)
ERYTHROCYTE [DISTWIDTH] IN BLOOD BY AUTOMATED COUNT: 12.1 % (ref 12.3–15.4)
GLOBULIN UR ELPH-MCNC: 3.1 GM/DL
GLUCOSE BLDC GLUCOMTR-MCNC: 82 MG/DL (ref 70–99)
GLUCOSE SERPL-MCNC: 84 MG/DL (ref 65–99)
GRAM STN SPEC: NORMAL
HCT VFR BLD AUTO: 38.5 % (ref 34–46.6)
HGB BLD-MCNC: 12.8 G/DL (ref 12–15.9)
IMM GRANULOCYTES # BLD AUTO: 0.07 10*3/MM3 (ref 0–0.05)
IMM GRANULOCYTES NFR BLD AUTO: 0.7 % (ref 0–0.5)
LYMPHOCYTES # BLD AUTO: 3.5 10*3/MM3 (ref 0.7–3.1)
LYMPHOCYTES NFR BLD AUTO: 34.7 % (ref 19.6–45.3)
MAGNESIUM SERPL-MCNC: 2.1 MG/DL (ref 1.6–2.6)
MCH RBC QN AUTO: 30.4 PG (ref 26.6–33)
MCHC RBC AUTO-ENTMCNC: 33.2 G/DL (ref 31.5–35.7)
MCV RBC AUTO: 91.4 FL (ref 79–97)
MONOCYTES # BLD AUTO: 0.65 10*3/MM3 (ref 0.1–0.9)
MONOCYTES NFR BLD AUTO: 6.4 % (ref 5–12)
NEUTROPHILS NFR BLD AUTO: 5.84 10*3/MM3 (ref 1.7–7)
NEUTROPHILS NFR BLD AUTO: 57.9 % (ref 42.7–76)
NRBC BLD AUTO-RTO: 0 /100 WBC (ref 0–0.2)
PHOSPHATE SERPL-MCNC: 2.9 MG/DL (ref 2.5–4.5)
PLATELET # BLD AUTO: 312 10*3/MM3 (ref 140–450)
PMV BLD AUTO: 9.9 FL (ref 6–12)
POTASSIUM SERPL-SCNC: 3.8 MMOL/L (ref 3.5–5.2)
PROT SERPL-MCNC: 6.4 G/DL (ref 6–8.5)
RBC # BLD AUTO: 4.21 10*6/MM3 (ref 3.77–5.28)
SODIUM SERPL-SCNC: 138 MMOL/L (ref 136–145)
WBC NRBC COR # BLD AUTO: 10.09 10*3/MM3 (ref 3.4–10.8)

## 2025-08-26 PROCEDURE — 85025 COMPLETE CBC W/AUTO DIFF WBC: CPT | Performed by: INTERNAL MEDICINE

## 2025-08-26 PROCEDURE — 94799 UNLISTED PULMONARY SVC/PX: CPT

## 2025-08-26 PROCEDURE — 99232 SBSQ HOSP IP/OBS MODERATE 35: CPT | Performed by: INTERNAL MEDICINE

## 2025-08-26 PROCEDURE — 83735 ASSAY OF MAGNESIUM: CPT | Performed by: INTERNAL MEDICINE

## 2025-08-26 PROCEDURE — 97161 PT EVAL LOW COMPLEX 20 MIN: CPT

## 2025-08-26 PROCEDURE — 99239 HOSP IP/OBS DSCHRG MGMT >30: CPT

## 2025-08-26 PROCEDURE — 25010000002 AMPICILLIN-SULBACTAM PER 1.5 G: Performed by: INTERNAL MEDICINE

## 2025-08-26 PROCEDURE — 25010000002 MORPHINE PER 10 MG: Performed by: INTERNAL MEDICINE

## 2025-08-26 PROCEDURE — 63710000001 PREDNISONE PER 1 MG: Performed by: INTERNAL MEDICINE

## 2025-08-26 PROCEDURE — 80053 COMPREHEN METABOLIC PANEL: CPT | Performed by: INTERNAL MEDICINE

## 2025-08-26 PROCEDURE — 84100 ASSAY OF PHOSPHORUS: CPT | Performed by: INTERNAL MEDICINE

## 2025-08-26 PROCEDURE — 82948 REAGENT STRIP/BLOOD GLUCOSE: CPT | Performed by: INTERNAL MEDICINE

## 2025-08-26 RX ORDER — OXYCODONE AND ACETAMINOPHEN 5; 325 MG/1; MG/1
1 TABLET ORAL EVERY 6 HOURS PRN
Refills: 0 | Status: DISCONTINUED | OUTPATIENT
Start: 2025-08-26 | End: 2025-08-26 | Stop reason: HOSPADM

## 2025-08-26 RX ORDER — PREDNISONE 20 MG/1
40 TABLET ORAL
Qty: 6 TABLET | Refills: 0 | Status: SHIPPED | OUTPATIENT
Start: 2025-08-27 | End: 2025-08-30

## 2025-08-26 RX ORDER — ASPIRIN 81 MG/1
81 TABLET ORAL DAILY
Qty: 30 TABLET | Refills: 0 | Status: SHIPPED | OUTPATIENT
Start: 2025-08-27

## 2025-08-26 RX ORDER — IBUPROFEN 400 MG/1
400 TABLET, FILM COATED ORAL EVERY 8 HOURS PRN
Qty: 90 TABLET | Refills: 1 | OUTPATIENT
Start: 2025-08-26 | End: 2025-08-26 | Stop reason: HOSPADM

## 2025-08-26 RX ADMIN — BUPRENORPHINE HYDROCHLORIDE AND NALOXONE HYDROCHLORIDE DIHYDRATE 8 MG: 8; 2 TABLET SUBLINGUAL at 08:52

## 2025-08-26 RX ADMIN — ASPIRIN 81 MG: 81 TABLET, COATED ORAL at 08:51

## 2025-08-26 RX ADMIN — PAROXETINE HYDROCHLORIDE 20 MG: 20 TABLET, FILM COATED ORAL at 08:52

## 2025-08-26 RX ADMIN — PREDNISONE 40 MG: 20 TABLET ORAL at 08:52

## 2025-08-26 RX ADMIN — MORPHINE SULFATE 1 MG: 2 INJECTION, SOLUTION INTRAMUSCULAR; INTRAVENOUS at 05:48

## 2025-08-26 RX ADMIN — AMPICILLIN SODIUM AND SULBACTAM SODIUM 3 G: 2; 1 INJECTION, POWDER, FOR SOLUTION INTRAMUSCULAR; INTRAVENOUS at 04:16

## 2025-08-26 RX ADMIN — AMPICILLIN SODIUM AND SULBACTAM SODIUM 3 G: 2; 1 INJECTION, POWDER, FOR SOLUTION INTRAMUSCULAR; INTRAVENOUS at 08:52

## 2025-08-26 RX ADMIN — IPRATROPIUM BROMIDE AND ALBUTEROL SULFATE 3 ML: .5; 3 SOLUTION RESPIRATORY (INHALATION) at 00:27

## 2025-08-26 RX ADMIN — SPIRONOLACTONE 25 MG: 25 TABLET ORAL at 08:52

## 2025-08-28 LAB
BACTERIA SPEC AEROBE CULT: NORMAL
BACTERIA SPEC AEROBE CULT: NORMAL

## (undated) DEVICE — GW FC FLOP/TP .035 260CM 3MM

## (undated) DEVICE — CATH F6 ST JL 3.5 100CM: Brand: SUPERTORQUE

## (undated) DEVICE — CATH F5 INF JL 3.5 100CM: Brand: INFINITI

## (undated) DEVICE — CATH F6 ST JR 3.5 100CM: Brand: SUPERTORQUE

## (undated) DEVICE — GLIDESHEATH SLENDER STAINLESS STEEL KIT: Brand: GLIDESHEATH SLENDER